# Patient Record
Sex: MALE | URBAN - METROPOLITAN AREA
[De-identification: names, ages, dates, MRNs, and addresses within clinical notes are randomized per-mention and may not be internally consistent; named-entity substitution may affect disease eponyms.]

---

## 2021-01-30 ENCOUNTER — IMMUNIZATIONS (OUTPATIENT)
Dept: FAMILY MEDICINE CLINIC | Facility: HOSPITAL | Age: 81
End: 2021-01-30

## 2021-01-30 DIAGNOSIS — Z23 ENCOUNTER FOR IMMUNIZATION: Primary | ICD-10-CM

## 2021-01-30 PROCEDURE — 0011A SARS-COV-2 / COVID-19 MRNA VACCINE (MODERNA) 100 MCG: CPT

## 2021-01-30 PROCEDURE — 91301 SARS-COV-2 / COVID-19 MRNA VACCINE (MODERNA) 100 MCG: CPT

## 2021-02-27 ENCOUNTER — IMMUNIZATIONS (OUTPATIENT)
Dept: FAMILY MEDICINE CLINIC | Facility: HOSPITAL | Age: 81
End: 2021-02-27

## 2021-02-27 DIAGNOSIS — Z23 ENCOUNTER FOR IMMUNIZATION: Primary | ICD-10-CM

## 2021-02-27 PROCEDURE — 91301 SARS-COV-2 / COVID-19 MRNA VACCINE (MODERNA) 100 MCG: CPT

## 2021-02-27 PROCEDURE — 0012A SARS-COV-2 / COVID-19 MRNA VACCINE (MODERNA) 100 MCG: CPT

## 2024-04-10 ENCOUNTER — TELEPHONE (OUTPATIENT)
Dept: CARDIOTHORACIC SURGERY | Facility: CLINIC | Age: 84
End: 2024-04-10
Payer: MEDICARE

## 2024-04-10 DIAGNOSIS — I35.0 NONRHEUMATIC AORTIC VALVE STENOSIS: Primary | ICD-10-CM

## 2024-04-10 NOTE — TELEPHONE ENCOUNTER
Direct referral to Dr. Campos for AS consult from Dr. Ross Gillespie.    Please request recent cardiology notes, cath disc/report, echo disc/report.    Please schedule NP appt with CTA TAVR prior.

## 2024-04-11 NOTE — TELEPHONE ENCOUNTER
Records were scanned this morning.  I called pt and LM.    Echo 3/15 at [facility].  I will ask pt if he has echo on disc.      Cath was done in 2022.  Will another be needed?

## 2024-04-11 NOTE — TELEPHONE ENCOUNTER
Ok TY.  I placed call to referring office.  They do not yet have the cardiac cath report.  They can confirm this was done on 4/10 at [facility].    Called [facility] Med Records.  Cath report received.

## 2024-04-15 NOTE — TELEPHONE ENCOUNTER
Pt returned call in regards to NPV with Dr. Campos. Pt's insurance has been updated on file.     Pt can be reached at [phone].

## 2024-04-15 NOTE — TELEPHONE ENCOUNTER
Spoke to pt, scheduled NPV with RR 5/13/24 @ 1:30 with CTA TAVR prior 5/13/24 @ 9:20. NPP and reminders mailed out.

## 2024-05-06 ENCOUNTER — TELEPHONE (OUTPATIENT)
Dept: CARDIOTHORACIC SURGERY | Facility: CLINIC | Age: 84
End: 2024-05-06

## 2024-05-06 NOTE — TELEPHONE ENCOUNTER
Hello! This pt is coming in to see Dr. Campos 5/13. Can we request the echo images from [facility] 03/15/2024 and [facility]. Thanks!

## 2024-05-08 ENCOUNTER — DOCUMENTATION (OUTPATIENT)
Dept: CARDIOTHORACIC SURGERY | Facility: CLINIC | Age: 84
End: 2024-05-08
Payer: MEDICARE

## 2024-05-08 DIAGNOSIS — I35.0 NONRHEUMATIC AORTIC VALVE STENOSIS: Primary | ICD-10-CM

## 2024-05-08 NOTE — PROGRESS NOTES
**Preliminary risk based on current available patient data**  Procedure Type: Isolated AVR   PERIOPERATIVE OUTCOME ESTIMATE %   Operative Mortality 3.9%   Morbidity & Mortality 17.3%   Stroke 2.61%   Renal Failure 3.39%   Reoperation 4.44%   Prolonged Ventilation 8.97%   Deep Sternal Wound Infection 0.229%   Long Hospital Stay (>14 days) 6.79%   Short Hospital Stay (<6 days)* 27%     Procedure Type: CABG + AVR   PERIOPERATIVE OUTCOME ESTIMATE %   Operative Mortality 5.45%   Morbidity & Mortality 18.8%   Stroke 1.35%   Renal Failure 4%   Reoperation 5.26%   Prolonged Ventilation 12.2%   Deep Sternal Wound Infection 0.454%   Long Hospital Stay (>14 days) 11.1%   Short Hospital Stay (<6 days)* 19.8%

## 2024-05-13 ENCOUNTER — HOSPITAL ENCOUNTER (OUTPATIENT)
Dept: RADIOLOGY | Facility: HOSPITAL | Age: 84
Discharge: HOME | End: 2024-05-13
Attending: STUDENT IN AN ORGANIZED HEALTH CARE EDUCATION/TRAINING PROGRAM
Payer: MEDICARE

## 2024-05-13 ENCOUNTER — HOSPITAL ENCOUNTER (OUTPATIENT)
Dept: CARDIOLOGY | Facility: HOSPITAL | Age: 84
Discharge: HOME | End: 2024-05-13
Attending: NURSE PRACTITIONER
Payer: MEDICARE

## 2024-05-13 ENCOUNTER — APPOINTMENT (OUTPATIENT)
Dept: LAB | Facility: HOSPITAL | Age: 84
End: 2024-05-13
Attending: NURSE PRACTITIONER
Payer: MEDICARE

## 2024-05-13 ENCOUNTER — OFFICE VISIT (OUTPATIENT)
Dept: CARDIOTHORACIC SURGERY | Facility: CLINIC | Age: 84
End: 2024-05-13
Payer: MEDICARE

## 2024-05-13 ENCOUNTER — TELEPHONE (OUTPATIENT)
Dept: CARDIOTHORACIC SURGERY | Facility: CLINIC | Age: 84
End: 2024-05-13

## 2024-05-13 ENCOUNTER — OFFICE VISIT (OUTPATIENT)
Dept: CARDIOLOGY | Facility: CLINIC | Age: 84
End: 2024-05-13
Payer: MEDICARE

## 2024-05-13 VITALS
DIASTOLIC BLOOD PRESSURE: 98 MMHG | RESPIRATION RATE: 18 BRPM | WEIGHT: 226 LBS | HEART RATE: 67 BPM | BODY MASS INDEX: 33.47 KG/M2 | SYSTOLIC BLOOD PRESSURE: 139 MMHG | TEMPERATURE: 98.1 F | OXYGEN SATURATION: 99 % | HEIGHT: 69 IN

## 2024-05-13 VITALS — HEIGHT: 69 IN | SYSTOLIC BLOOD PRESSURE: 139 MMHG | DIASTOLIC BLOOD PRESSURE: 98 MMHG

## 2024-05-13 DIAGNOSIS — I35.0 AORTIC VALVE STENOSIS, ETIOLOGY OF CARDIAC VALVE DISEASE UNSPECIFIED: ICD-10-CM

## 2024-05-13 DIAGNOSIS — R09.89 CAROTID BRUIT, UNSPECIFIED LATERALITY: ICD-10-CM

## 2024-05-13 DIAGNOSIS — R79.1 ABNORMAL COAGULATION PROFILE: ICD-10-CM

## 2024-05-13 DIAGNOSIS — I25.10 CORONARY ARTERY DISEASE, UNSPECIFIED VESSEL OR LESION TYPE, UNSPECIFIED WHETHER ANGINA PRESENT, UNSPECIFIED WHETHER NATIVE OR TRANSPLANTED HEART: Primary | ICD-10-CM

## 2024-05-13 DIAGNOSIS — R79.9 ABNORMAL FINDING OF BLOOD CHEMISTRY, UNSPECIFIED: ICD-10-CM

## 2024-05-13 DIAGNOSIS — I25.810 CORONARY ARTERY DISEASE INVOLVING NONAUTOLOGOUS BIOLOGICAL CORONARY BYPASS GRAFT WITHOUT ANGINA PECTORIS: ICD-10-CM

## 2024-05-13 DIAGNOSIS — I35.0 NONRHEUMATIC AORTIC VALVE STENOSIS: ICD-10-CM

## 2024-05-13 DIAGNOSIS — Z00.6 ENCOUNTER FOR EXAMINATION FOR NORMAL COMPARISON AND CONTROL IN CLINICAL RESEARCH PROGRAM: ICD-10-CM

## 2024-05-13 DIAGNOSIS — E03.9 HYPOTHYROIDISM, UNSPECIFIED TYPE: Primary | ICD-10-CM

## 2024-05-13 DIAGNOSIS — I25.10 CORONARY ARTERY DISEASE, UNSPECIFIED VESSEL OR LESION TYPE, UNSPECIFIED WHETHER ANGINA PRESENT, UNSPECIFIED WHETHER NATIVE OR TRANSPLANTED HEART: ICD-10-CM

## 2024-05-13 DIAGNOSIS — I35.0 AORTIC VALVE STENOSIS, ETIOLOGY OF CARDIAC VALVE DISEASE UNSPECIFIED: Primary | ICD-10-CM

## 2024-05-13 DIAGNOSIS — I35.0 NONRHEUMATIC AORTIC VALVE STENOSIS: Primary | ICD-10-CM

## 2024-05-13 LAB
ALBUMIN SERPL-MCNC: 4.3 G/DL (ref 3.5–5.7)
ALP SERPL-CCNC: 65 IU/L (ref 34–125)
ALT SERPL-CCNC: 19 IU/L (ref 7–52)
ANION GAP SERPL CALC-SCNC: 8 MEQ/L (ref 3–15)
AORTIC ROOT ANNULUS - M-MODE: 3.25 CM
AORTIC VALVE AT: 103.6 MS
AORTIC VALVE MEAN VELOCITY: 2.83 M/S
AORTIC VALVE VELOCITY TIME INTEGRAL: 100.28 CM
APTT PPP: 28 SEC (ref 23–35)
AST SERPL-CCNC: 23 IU/L (ref 13–39)
AV MEAN GRADIENT: 39.17 MMHG
AV PEAK GRADIENT: 62.83 MMHG
AV PEAK VELOCITY-S: 4.01 M/S
AV VALVE AREA: 0.76 CM2
AV VELOCITY RATIO: 0.26
AVA (VTI): 1.07 CM2
BASOPHILS # BLD: 0.04 K/UL (ref 0.01–0.1)
BASOPHILS NFR BLD: 0.5 %
BILIRUB SERPL-MCNC: 0.6 MG/DL (ref 0.3–1.2)
BNP SERPL-MCNC: 59 PG/ML
BUN SERPL-MCNC: 16 MG/DL (ref 7–25)
CALCIUM SERPL-MCNC: 9.6 MG/DL (ref 8.6–10.3)
CHLORIDE SERPL-SCNC: 104 MEQ/L (ref 98–107)
CO2 SERPL-SCNC: 27 MEQ/L (ref 21–31)
CREAT SERPL-MCNC: 1 MG/DL (ref 0.7–1.3)
CUSP SEPARATION: 1.77 CM
DIFFERENTIAL METHOD BLD: NORMAL
DOP CALC LVOT STROKE VOLUME: 107.24 ML
DOP CALC RVOT VTI: 11.77 CM
E WAVE DECELERATION TIME: 220.94 MS
E WAVE DECELERATION TIME: 220.94 MS
E/A RATIO: 0.88
E/E' RATIO: 13.21
E/LAT E' RATIO: 18.97
EDV (BP): 147.33 ML
EF (A4C): 59.53 %
EF A2C: 50.13 %
EGFRCR SERPLBLD CKD-EPI 2021: >60 ML/MIN/1.73M*2
EJECTION FRACTION: 55.29 %
EOSINOPHIL # BLD: 0.22 K/UL (ref 0.04–0.54)
EOSINOPHIL NFR BLD: 2.8 %
ERYTHROCYTE [DISTWIDTH] IN BLOOD BY AUTOMATED COUNT: 14.1 % (ref 11.6–14.4)
EST. AVERAGE GLUCOSE BLD GHB EST-MCNC: 131 MG/DL
ESV (BP): 65.88 ML
FRACTIONAL SHORTENING: 36.39 %
GLUCOSE SERPL-MCNC: 99 MG/DL (ref 70–99)
HBA1C MFR BLD: 6.2 %
HCT VFR BLD AUTO: 42.7 % (ref 40.1–51)
HEART RATE: 58 BPM
HGB BLD-MCNC: 14.1 G/DL (ref 13.7–17.5)
IMM GRANULOCYTES # BLD AUTO: 0.02 K/UL (ref 0–0.08)
IMM GRANULOCYTES NFR BLD AUTO: 0.3 %
INR PPP: 1.1
INTERVENTRICULAR SEPTUM: 0.88 CM
LA ESV (BP): 63.62 ML
LAAS-AP2: 23.16 CM2
LAAS-AP4: 20.2 CM2
LAD 2D: 4.45 CM
LAV-S: 71.1 ML
LEFT ATRIUM VOLUME: 55.4 ML
LEFT INTERNAL DIMENSION IN SYSTOLE: 3.95 CM
LEFT VENTRICLE DIASTOLIC VOLUME: 140.1 ML
LEFT VENTRICLE SYSTOLIC VOLUME: 56.7 ML
LEFT VENTRICULAR INTERNAL DIMENSION IN DIASTOLE: 6.21 CM
LEFT VENTRICULAR MASS: 258.32 G
LEFT VENTRICULAR POSTERIOR WALL IN END DIASTOLE: 1.1 CM
LV DIASTOLIC VOLUME: 149.4 ML
LV ESV (APICAL 2 CHAMBER): 74.5 ML
LVCO: 4.68 LITERS PER MINUTE
LVOT 2D: 2.29 CM
LVOT A: 4.13 CM2
LVOT MG: 1.78 MMHG
LVOT MV: 0.63 M/S
LVOT PEAK VELOCITY: 0.94 M/S
LVOT PG: 3.55 MMHG
LVOT VTI: 26.05 CM
LYMPHOCYTES # BLD: 1.78 K/UL (ref 1.2–3.5)
LYMPHOCYTES NFR BLD: 22.8 %
MCH RBC QN AUTO: 30.2 PG (ref 28–33.2)
MCHC RBC AUTO-ENTMCNC: 33 G/DL (ref 32.2–36.5)
MCV RBC AUTO: 91.4 FL (ref 83–98)
MONOCYTES # BLD: 0.62 K/UL (ref 0.3–1)
MONOCYTES NFR BLD: 7.9 %
MR FLOW: 0.37 ML/S
MV D: 2.86 CM
MV E'TISSUE VEL-LAT: 0.05 M/S
MV E'TISSUE VEL-MED: 0.07 M/S
MV PEAK A VEL: 1.09 M/S
MV PEAK E VEL: 0.96 M/S
MV REGURGITANT FRACTION: 40.31 %
MVA (PISA): 1.06 CM2
NEUTROPHILS # BLD: 5.12 K/UL (ref 1.7–7)
NEUTS SEG NFR BLD: 65.7 %
NRBC BLD-RTO: 0 %
PDW BLD AUTO: 11.2 FL (ref 9.4–12.4)
PISA ALIAS VEL MV: 0.35 M/S
PISA RADIUS: 0.41 CM
PLATELET # BLD AUTO: 276 K/UL (ref 150–350)
POTASSIUM SERPL-SCNC: 4.3 MEQ/L (ref 3.5–5.1)
PROT SERPL-MCNC: 7 G/DL (ref 6–8.2)
PROTHROMBIN TIME: 13.8 SEC (ref 12.2–14.5)
RBC # BLD AUTO: 4.67 M/UL (ref 4.5–5.8)
SODIUM SERPL-SCNC: 139 MEQ/L (ref 136–145)
TSH SERPL DL<=0.05 MIU/L-ACNC: 24.38 MIU/L (ref 0.34–5.6)
WBC # BLD AUTO: 7.8 K/UL (ref 3.8–10.5)

## 2024-05-13 PROCEDURE — 99204 OFFICE O/P NEW MOD 45 MIN: CPT | Performed by: THORACIC SURGERY (CARDIOTHORACIC VASCULAR SURGERY)

## 2024-05-13 PROCEDURE — 93306 TTE W/DOPPLER COMPLETE: CPT

## 2024-05-13 PROCEDURE — 99205 OFFICE O/P NEW HI 60 MIN: CPT | Performed by: INTERNAL MEDICINE

## 2024-05-13 PROCEDURE — 71275 CT ANGIOGRAPHY CHEST: CPT

## 2024-05-13 PROCEDURE — 84443 ASSAY THYROID STIM HORMONE: CPT

## 2024-05-13 PROCEDURE — 85025 COMPLETE CBC W/AUTO DIFF WBC: CPT

## 2024-05-13 PROCEDURE — 93306 TTE W/DOPPLER COMPLETE: CPT | Mod: 26 | Performed by: INTERNAL MEDICINE

## 2024-05-13 PROCEDURE — 83880 ASSAY OF NATRIURETIC PEPTIDE: CPT

## 2024-05-13 PROCEDURE — 83036 HEMOGLOBIN GLYCOSYLATED A1C: CPT

## 2024-05-13 PROCEDURE — 87081 CULTURE SCREEN ONLY: CPT

## 2024-05-13 PROCEDURE — 80053 COMPREHEN METABOLIC PANEL: CPT

## 2024-05-13 PROCEDURE — 93000 ELECTROCARDIOGRAM COMPLETE: CPT | Performed by: THORACIC SURGERY (CARDIOTHORACIC VASCULAR SURGERY)

## 2024-05-13 PROCEDURE — 86901 BLOOD TYPING SEROLOGIC RH(D): CPT

## 2024-05-13 PROCEDURE — 85730 THROMBOPLASTIN TIME PARTIAL: CPT

## 2024-05-13 PROCEDURE — 85610 PROTHROMBIN TIME: CPT

## 2024-05-13 PROCEDURE — 63600105 HC IODINE BASED CONTRAST: Mod: JZ

## 2024-05-13 PROCEDURE — 36415 COLL VENOUS BLD VENIPUNCTURE: CPT

## 2024-05-13 RX ORDER — MUPIROCIN 20 MG/G
1 OINTMENT TOPICAL 2 TIMES DAILY
Qty: 10 G | Refills: 0 | Status: SHIPPED | OUTPATIENT
Start: 2024-05-13 | End: 2024-05-18

## 2024-05-13 RX ORDER — CHLORHEXIDINE GLUCONATE ORAL RINSE 1.2 MG/ML
15 SOLUTION DENTAL ONCE
Status: CANCELLED | OUTPATIENT
Start: 2024-05-13 | End: 2024-05-13

## 2024-05-13 RX ORDER — PANTOPRAZOLE SODIUM 40 MG/1
40 TABLET, DELAYED RELEASE ORAL NIGHTLY
Status: CANCELLED | OUTPATIENT
Start: 2024-05-13 | End: 2024-05-14

## 2024-05-13 RX ORDER — IOPAMIDOL 755 MG/ML
100 INJECTION, SOLUTION INTRAVASCULAR
Status: COMPLETED | OUTPATIENT
Start: 2024-05-13 | End: 2024-05-13

## 2024-05-13 RX ADMIN — IOPAMIDOL 100 ML: 755 INJECTION, SOLUTION INTRAVENOUS at 11:23

## 2024-05-13 NOTE — PROGRESS NOTES
.                     Interventional Cardiology   [facility]   [facility]                      [phone]        New patient consult : TAVR         Daljit TOBI Era Flores presents today for consultation of aortic stenosis and need for TAVR  referral from Dr Gillespie      Patient is 84 y.o. with aortic stenosis, CAD s/p minimally invasive bypass grafting in 2014 with Dr Deleon and subsequent PCI in 2016     He has been followed by Dr Gillespie and has had worsening Dyspnea on exertion. ECHO showed aortic stenosis advanced to severe and then  cath was completed and showed a patent LIMA to the LAD ,      Additional history  carotid artery stenosis, hyperlipidemia and hypertension     He reports he has been feeling more shortness of breath with activities . He doesn't have chest pain or palpitations. He has not lightheadedness or dizziness . No swelling in lower extremities.   He does not have stairs in the house so he does not do stair climbing often but his family notices he is huffing and puffing.     He is here with his wife whom is dependant on him due to her strokes. 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    Allergies: Gluten      Current Medications    Current Outpatient Medications:     aspirin 81 mg enteric coated tablet, Take 81 mg by mouth nightly., Disp: , Rfl:     clopidogreL  (PLAVIX) 75 mg tablet, Take 75 mg by mouth daily., Disp: , Rfl:     DULoxetine (CYMBALTA) 60 mg capsule, Take 60 mg by mouth nightly., Disp: , Rfl:     ezetimibe (ZETIA) 10 mg tablet, Take 10 mg by mouth nightly., Disp: , Rfl:     levothyroxine (SYNTHROID) 150 mcg tablet, Take 150 mcg by mouth daily., Disp: , Rfl:     LORazepam (ATIVAN) 1 mg tablet, Take 1 mg by mouth 2 (two) times a day., Disp: , Rfl:     metFORMIN (GLUCOPHAGE) 500 mg tablet, Take 500 mg by mouth 2 (two) times a day with meals., Disp: , Rfl:     mupirocin (BACTROBAN) 2 % ointment, Apply 1 Application topically 2 (two) times a day for 5 days. Apply small amount to both nostrils with Qtip ,morning and bedtime, starting five days before surgery., Disp: 10 g, Rfl: 0    pantoprazole (PROTONIX) 40 mg EC tablet, Take 40 mg by mouth nightly., Disp: , Rfl:     rosuvastatin (CRESTOR) 40 mg tablet, Take 40 mg by mouth daily., Disp: , Rfl:     tamsulosin (FLOMAX) 0.4 mg capsule, Take 0.4 mg by mouth nightly., Disp: , Rfl:     temazepam (RESTORIL) 15 mg capsule, Take 15 mg by mouth nightly as needed for sleep., Disp: , Rfl:      Review of Systems  ROS as above    Objective     Physicial Exam  Physical Exam  Vitals reviewed.   Constitutional:       Appearance: Normal appearance.   Eyes:      Pupils: Pupils are equal, round, and reactive to light.   Cardiovascular:      Heart sounds: Murmur heard.      Harsh midsystolic murmur is present with a grade of 3/6 at the upper right sternal border radiating to the neck.   Pulmonary:      Effort: Pulmonary effort is normal.      Breath sounds: Normal breath sounds.   Abdominal:      General: Abdomen is flat.      Palpations: Abdomen is soft.   Musculoskeletal:         General: Swelling (+1 lower extremities) present.   Skin:     General: Skin is warm and dry.   Neurological:      General: No focal deficit present.      Mental Status: He is alert and oriented to person, place, and time.   Psychiatric:         Mood and  Affect: Mood normal.         Behavior: Behavior normal.         Thought Content: Thought content normal.         Judgment: Judgment normal.       See vitals from previous encounter today       Labs  ECHO 5/13/24  Interpretation Summary       The left ventricular cavity size is normal with normal wall thickness and preserved systolic function. Estimated EF 60-65%. No regional wall motion abnormalities. Indeterminate diastolic function.     The aortic valve is tricuspid. Severe aortic stenosis with a peak velocity of 4.3 m/s and a mean gradient of 41 mmHg measured from the apex. Calculated aortic valve area of 0.9 cm2. Trace regurgitation.      The visualized portions of the aortic root and ascending aorta are normal in size.     The mitral valve is mildly thickened. Mild mitral annular calcification. No stenosis. Mild regurgitation.      The left atrium is normal in size.      The right ventricular cavity is normal with preserved systolic function.      The pulmonic valve is not well seen. There is trace pulmonic regurgitation.     The tricuspid valve is normal in appearance. Trace regurgitation with insufficient regurgitant jet to estimate RVSP.       Right atrium is normal in size.     The IVC is normal in size and collapses > 50% with inspiration consistent with normal right atrial pressures.     Trace pericardial effusion.      No prior study for comparison.         Imaging  Cardiac cath 4/10/24   VALVE FINDINGS:   There is severe aortic valve stenosis.   CORONARY ANGIOGRAPHY:   The coronary circulation is right dominant.   Coronary Anatomy:   Left Main Coronary Artery:   The left main is large sized and calcified and shows a distal 30% stenosis.   Left Anterior Descending Coronary Artery:   The left anterior descending is a large sized vessel that wraps the apex serving a medium proximal   diagonal branch and a large mid diagonal branch. There is a long segment of widely patent stent in the   proximal to  midportion and following the second diagonal branch, there is a focal 100% occlusion. The mid   to distal left anterior descending is served by patent bypass graft. The remainder the system shows mild   diffuse atherosclerosis.   Circumflex Coronary Artery:   The left circumflex is a medium sized, nondominant vessel that serves a large ramus intermedius and a   large inferolateral marginal branch. The entire system shows mild diffuse atherosclerosis.   Right Coronary Artery :   The right coronary artery is a large sized, dominant vessel that serves a large PDA and a medium   posterolateral branch. The entire system shows mild diffuse atherosclerosis.   CORONARY GRAFTS:   LIMA Graft:   LIMA to left anterior descending is a widely patent and healthy bypass graft that touches down to the mid   left anterior descending serving the mid to apical vessel with brisk and unobstructed flow. There is a   long segment of patent stent in the mid to distal vessel.   IMPRESSION:   1. Severe, symptomatic aortic stenosis with a calculated aortic valve area of 0.77 cmÂ².   2. Well compensated coronary artery disease with multiple patent left anterior descending stents, a   widely patent LIMA to left anterior descending bypass graft, and mild residual coronary artery disease   otherwise.   3. Normal left and right heart catheterization pressures otherwise without evidence of pulmonary   hypertension or volume overload.       CT TAVR   arrative & Impression   CTA Protocol: TAVR     CLINICAL HISTORY: Aortic Stenosis.     COMMENT:     1. PROCEDURE: The patient was brought to the CT prep room in stable condition.  The patient was n.p.o. for 4 hours. An 18-gauge Intracath IV was started in the  right antecubital space. The patient's heart rate and blood pressure were  recorded. A TAVR protocol multidetector CT of the heart and aorta was then  performed in 3 phases includin). Noncontrast CT of the chest. 2).  Retrospective ECG gated CTA  of the heart, 3). Helical non gated CTA of the  chest, abdomen and pelvis. The CT angiogram was performed utilizing 100 ml of  Isovue 370 iodine contrast administered intravenously. One or more dose  reduction techniques were utilized for this examination. Postprocessing for 3D  evaluation was completed and sent to [employer] 3D workstation for  interpretation. The patient tolerated the procedure well and was discharged in  stable condition. The overall quality of the scan was good.     2. CARDIAC FINDINGS:     CORONARY ARTERIES: Three-vessel coronary atherosclerosis.  Evidence of prior  percutaneous coronary intervention.  Evidence of prior LIMA-left anterior  descending coronary artery aortocoronary bypass graft.  CACS: Not applicable     CARDIAC STRUCTURE:  Left Ventricle: Normal cavity size, mild concentric hypertrophy. Normal systolic  function. Normal wall motion.  Septal motion consistent with a  postoperative state.             Left Atrium: Mildly  dilated              Left Atrial Appendage: Patent  Right Ventricle: Normal cavity size and systolic  function               Right Atrium: Normal in size   Atrial septum: Patent foramen ovale      SVC IVC: Normal connections         Mitral Valve: Mildly thickened.  Mild mitral annular  calcification.             Tricuspid Valve: Not well seen               Pericardium: Intact     AORTIC VALVE:  Description: Tricuspid aortic valve. Thickened and calcified cusps with  decreased systolic excursion.  Aortic valve area measures 1.1 sq cm by direct  planimetry  Aortic Valve Calcium Score: 1894  Subvalvular LVOT Calcium: None  Aortic Root: Atherosclerotic     TAVR AORTIC VALVE MEASUREMENTS: Phase: 30     Aortic valvular annulus: Diameter: Max 34 mm. Min 24 mm. Area: 631 mm2.  Perimeter: 92 mm.  Sinotubular Junction Diameter: Max 34 mm. Min 31 mm. Area 852 mm2. Perimeter 105  mm.  Sinus of Valsalva Diameter: Max 36 mm. Min 34 mm Avg 35 . Height 25 mm.  LVOT: 5 mm below  annulus. Diameter: Max 35 mm. Min 24 mm. Area 627 mm2.  Perimeter 93 mm.  Left coronary artery height: 14 mm.  Right coronary artery height: 12 mm.  Ascending thoracic aorta maximum diameter: 39 x 37 mm.  Optimal Fluoroscopic CR angle: Welsh 13 CRA 10 degrees.  Aortic Neck angle: 60 degrees.     SUMMARY:  1. Tricuspid aortic valve. Cine and tomographic imaging features are consistent  with aortic stenosis. Please see body of the report for detailed measurements.  2. Preserved biventricular systolic function.  3. Three-vessel coronary atherosclerosis.  Evidence of prior percutaneous  coronary intervention.  Evidence of prior LIMA-left anterior descending  aortocoronary bypass graft.  Native vessel patency/stenosis severity could not  be assessed in this study.  Recommend correlation with invasive angiography as  appropriate.     Cardiac interpretation by Wero Guzman M.D. on       Assessment:    Aortic Stenosis     Echo personally reviewed by Dr Suh   Peak Velocity 4.3 m/s, Mean Gradient 41 mmHg, BIANCA 0.9 cm2, EF 55%, Calcium Score 1894.    NYHA II  Class I: No limitation of physical activity. Ordinary physical activity does not cause symptoms of HF.  Class II: Slight limitation of physical activity. Comfortable at rest, but ordinary physical activity results in symptoms of HF.  Class III: Marked limitation of physical activity. Comfortable at rest, but less than ordinary activity causes symptoms of HF.  Class IV: Symptoms occur even at rest; discomfort with any physical activity. Unable to carry on any physical activity without symptoms of HF.    No history of syncopal or presyncopal episodes.    Patient has not had recent hospitalization for CHF.          Patients STS Risk for Surgical AVR is   **Preliminary risk based on current available patient data**      Procedure Type: Isolated AVR   PERIOPERATIVE OUTCOME ESTIMATE %   Operative Mortality 3.9%   Morbidity & Mortality 17.3%   Stroke 2.61%   Renal Failure  3.39%   Reoperation 4.44%   Prolonged Ventilation 8.97%   Deep Sternal Wound Infection 0.229%   Long Hospital Stay (>14 days) 6.79%   Short Hospital Stay (<6 days)* 27%          Procedure Type: CABG + AVR   PERIOPERATIVE OUTCOME ESTIMATE %   Operative Mortality 5.45%   Morbidity & Mortality 18.8%   Stroke 1.35%   Renal Failure 4%   Reoperation 5.26%   Prolonged Ventilation 12.2%   Deep Sternal Wound Infection 0.454%   Long Hospital Stay (>14 days) 11.1%   Short Hospital Stay (<6 days)* 19.8%          .  Patient passed Frailty Exam.  Patient risk per MD assessment is high due to age and co morbidities and hostile chest .        Plan   Recommend proceeding with TAVR.  Patient is in agreement based on a personal decision to increase quality of life        Tentative surgery date of 5/20/24 for TAVR  was offered and accepted by patient.    He will complete his PATS today , EKG completed in the office , and cardiac surgery was seen as well today .   He will need dental clearance and carotid ultrasounds           I have discussed with the patient and family the rationale for procedures as well as possible alternatives.  We discussed potential risks and complications associated with this procedure. The risks include, but are not limited to: bleeding, infection, arrhythmias, myocardial infarction, para-valvular insufficiency,  stroke, death, renal as well as pulmonary insufficiency and the possibility of blood transfusions,  permanent pacemaker placement intra-aortic balloon placement,  assist device placement, cardiac wire perforation, and requirement of CPB.   Time was spent educating the patient about their heart disease diagnosis and treatment options. All questions and concerns were addressed.The patient understands and agrees to proceed.      By signing my name below, I, KIMBERLI Malik attest that this documentation has anette prepared under the direction and in the presence of KIMBERLI Dalton MD        I, Win Suh DO, personally performed the services described in this documentation as described by UVALDO in my presence, and it is accurate and complete.    DO Win Parkinson D.O., [facility]

## 2024-05-13 NOTE — PROGRESS NOTES
"Daljit Girard    [id number]    Your doctor has referred you for a CT examination that requires the injection of an iodinated contrast material into your bloodstream. This iodinated contrast material, sometimes referred to as \"x-ray dye\" allows for better interpretation of the x-ray films or CT images and results in a more accurate interpretation of the examination.     Without the use of iodinated contrast (x-ray dye), the examination may be less informative and result in a suboptimal examination, and possibly a delay in diagnosis and, if needed, treatment.     The iodinated contrast material is given through a small needle or catheter placed into a vein, usually on the inside of the elbow, on the back of hand, or in a vein in the foot or lower leg.    The most common, though still rare, potential reaction to an intravenous contrast injection is an allergic-like reaction. Most allergic-like reactions are mild, though a small subset of people can have more severe reactions. Mild reactions include mild / scattered hives, itching, scratchy throat, sneezing and nasal congestion. More severe reactions include facial swelling, severe difficulty breathing, wheezing and anaphylactic shock. Those with a prior history allergic-like reaction to the same class of contrast media (such as CT contrast or MRI contrast) are at the highest risk for an allergic reaction.     If you believe you had an allergic reaction to contrast in the past, please let our staff know. We can determine if this increases your risk for a future reaction and provide steps to decrease the risk. This may delay your examination, but it decreases the risk of having a new and possibly more severe reaction to the contrast injection.    People with a history of prior allergic reactions to other substances (such as unrelated medications and food) and patients with a history of asthma have slightly increased risk for an allergic reaction from contrast " material when compared with the general population, but do not require to be pretreated prior to a contrast injection.    You should notify the physician, nurse or technologist if you have ever had any of these conditions or if you have any questions.

## 2024-05-13 NOTE — TELEPHONE ENCOUNTER
NAME:Daljit Girard Jr                                                             SURGEON: Dr Campos   : 1940 PROCEDURE: TAVR   DIAGNOSIS:No diagnosis found.                                                              OR DATE :24   ALLERGIES:   Allergies   Allergen Reactions    Gluten      Celiac disease     POV: 4 Weeks   PREADMISSION TESTING: Completed POV w/ Echo:  Yes    TTE: Completed   POV PROVIDER:[facility]   CATH: Completed  If required with: Dr Suh   DIALYSIS:    No   CTA C/A/P: Completed      PREP FOR CASE:Completed      MUPIROCIN Rx SENT: Yes   INTERVENTIONAL CARD APPT:  No COMPLETED    CAROTIDS:   Required HEART FAILURE APPOINTMENT: No    DENTAL: Required       PFT'S: N/A   CONSENT SIGNEDYes    Any Additional Testing Needed:         ECG: No       6 MIN WALK TEST:    N/A Comments:

## 2024-05-13 NOTE — PROGRESS NOTES
I, Joe Campos MD, personally performed the services described in this documentation as scribed by KIMBERLI Malik in my presence, and it is both accurate and complete.    5/15/2024 10:44 AM    Jeo Campos MD, MS, FACS, [facility]  System Surgical Director,  Structural Heart Program  [facility]    Associate Professor of Surgery,  [facility]                           [facility]  Cardiothoracic Surgery   Dr. Andres AG   [facility]                      [phone]        New patient consult : Aortic Stenosis         Daljit Girard Jr presents today for consultation of aortic stenosis referral for Dr Gillespie      Patient is 84 y.o. with aortic stenosis, CAD s/p minimally invasive bypass grafting in 2014 and subsequent PCI in 2016  , cath was completed and showed a patent LIMA to the LAD , carotid artery stenosis, hyperlipidemia and hypertension     He reports he has been feeling more shortness of breath with activities . He doesn't have chest pain or palpitations. He has not lightheadedness or dizziness . No swelling in lower extremities.   He does not have stairs in the house so he does not do stair climbing often but his family notices he is huffing and puffing.     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    Allergies: Gluten      Current Medications    Current Outpatient Medications:     aspirin 81 mg enteric coated tablet, Take 81 mg by mouth nightly., Disp: , Rfl:     clopidogreL (PLAVIX) 75 mg tablet, Take 75 mg by mouth daily., Disp: , Rfl:     DULoxetine (CYMBALTA) 60 mg capsule, Take 60 mg by mouth nightly., Disp: , Rfl:     ezetimibe (ZETIA) 10 mg tablet, Take 10 mg by mouth nightly., Disp: , Rfl:     levothyroxine (SYNTHROID) 150 mcg tablet, Take 150 mcg by mouth daily., Disp: , Rfl:     LORazepam (ATIVAN) 1 mg tablet, Take 1 mg by mouth 2 (two) times a day., Disp: , Rfl:     metFORMIN (GLUCOPHAGE) 500 mg tablet, Take 500 mg by mouth 2 (two) times a day with meals., Disp: , Rfl:     pantoprazole (PROTONIX) 40 mg EC tablet, Take 40 mg by mouth nightly., Disp: , Rfl:     rosuvastatin (CRESTOR) 40 mg tablet, Take 40 mg by mouth daily., Disp: , Rfl:     tamsulosin (FLOMAX) 0.4 mg capsule, Take 0.4 mg by mouth nightly., Disp: , Rfl:     temazepam (RESTORIL) 15 mg capsule, Take 15 mg by mouth nightly as needed for sleep., Disp: , Rfl:      Review of Systems  ROS as above    Objective     Physicial Exam  Physical Exam  Vitals reviewed.   Constitutional:       Appearance: Normal appearance.   Eyes:      Pupils: Pupils are equal, round, and reactive to light.   Cardiovascular:      Heart sounds: Murmur heard.      Harsh midsystolic murmur is present with a grade of 3/6 at the upper right sternal border radiating to the neck.   Pulmonary:      Effort: Pulmonary effort is normal.      Breath sounds: Normal breath sounds.   Abdominal:      General: Abdomen is flat.      Palpations: Abdomen is soft.   Musculoskeletal:         General: Swelling (+1 lower extremities) present.   Skin:     General: Skin is warm and dry.   Neurological:      General: No focal deficit present.      Mental Status: He is alert and oriented to person, place, and time.   Psychiatric:         Mood and Affect:  "Mood normal.         Behavior: Behavior normal.         Thought Content: Thought content normal.         Judgment: Judgment normal.   Visit Vitals  BP (!) 139/98 (BP Location: Right upper arm, Patient Position: Sitting)   Pulse 67   Temp 36.7 °C (98.1 °F) (Temporal)   Resp 18   Ht 1.753 m (5' 9\")   Wt 103 kg (226 lb)   SpO2 99%   BMI 33.37 kg/m²       Labs  ECHO 5/13/24  Interpretation Summary       The left ventricular cavity size is normal with normal wall thickness and preserved systolic function. Estimated EF 60-65%. No regional wall motion abnormalities. Indeterminate diastolic function.     The aortic valve is tricuspid. Severe aortic stenosis with a peak velocity of 4.3 m/s and a mean gradient of 41 mmHg measured from the apex. Calculated aortic valve area of 0.9 cm2. Trace regurgitation.      The visualized portions of the aortic root and ascending aorta are normal in size.     The mitral valve is mildly thickened. Mild mitral annular calcification. No stenosis. Mild regurgitation.      The left atrium is normal in size.      The right ventricular cavity is normal with preserved systolic function.      The pulmonic valve is not well seen. There is trace pulmonic regurgitation.     The tricuspid valve is normal in appearance. Trace regurgitation with insufficient regurgitant jet to estimate RVSP.       Right atrium is normal in size.     The IVC is normal in size and collapses > 50% with inspiration consistent with normal right atrial pressures.     Trace pericardial effusion.      No prior study for comparison.         Imaging  Cardiac cath 4/10/24   VALVE FINDINGS:   There is severe aortic valve stenosis.   CORONARY ANGIOGRAPHY:   The coronary circulation is right dominant.   Coronary Anatomy:   Left Main Coronary Artery:   The left main is large sized and calcified and shows a distal 30% stenosis.   Left Anterior Descending Coronary Artery:   The left anterior descending is a large sized vessel that wraps " the apex serving a medium proximal   diagonal branch and a large mid diagonal branch. There is a long segment of widely patent stent in the   proximal to midportion and following the second diagonal branch, there is a focal 100% occlusion. The mid   to distal left anterior descending is served by patent bypass graft. The remainder the system shows mild   diffuse atherosclerosis.   Circumflex Coronary Artery:   The left circumflex is a medium sized, nondominant vessel that serves a large ramus intermedius and a   large inferolateral marginal branch. The entire system shows mild diffuse atherosclerosis.   Right Coronary Artery :   The right coronary artery is a large sized, dominant vessel that serves a large PDA and a medium   posterolateral branch. The entire system shows mild diffuse atherosclerosis.   CORONARY GRAFTS:   LIMA Graft:   LIMA to left anterior descending is a widely patent and healthy bypass graft that touches down to the mid   left anterior descending serving the mid to apical vessel with brisk and unobstructed flow. There is a   long segment of patent stent in the mid to distal vessel.   IMPRESSION:   1. Severe, symptomatic aortic stenosis with a calculated aortic valve area of 0.77 cmÂ².   2. Well compensated coronary artery disease with multiple patent left anterior descending stents, a   widely patent LIMA to left anterior descending bypass graft, and mild residual coronary artery disease   otherwise.   3. Normal left and right heart catheterization pressures otherwise without evidence of pulmonary   hypertension or volume overload.       CT TAVR   arrative & Impression   CTA Protocol: TAVR     CLINICAL HISTORY: Aortic Stenosis.     COMMENT:     1. PROCEDURE: The patient was brought to the CT prep room in stable condition.  The patient was n.p.o. for 4 hours. An 18-gauge Intracath IV was started in the  right antecubital space. The patient's heart rate and blood pressure were  recorded. A TAVR  protocol multidetector CT of the heart and aorta was then  performed in 3 phases includin). Noncontrast CT of the chest. 2).  Retrospective ECG gated CTA of the heart, 3). Helical non gated CTA of the  chest, abdomen and pelvis. The CT angiogram was performed utilizing 100 ml of  Isovue 370 iodine contrast administered intravenously. One or more dose  reduction techniques were utilized for this examination. Postprocessing for 3D  evaluation was completed and sent to [employer] 3D workstation for  interpretation. The patient tolerated the procedure well and was discharged in  stable condition. The overall quality of the scan was good.     2. CARDIAC FINDINGS:     CORONARY ARTERIES: Three-vessel coronary atherosclerosis.  Evidence of prior  percutaneous coronary intervention.  Evidence of prior LIMA-left anterior  descending coronary artery aortocoronary bypass graft.  CACS: Not applicable     CARDIAC STRUCTURE:  Left Ventricle: Normal cavity size, mild concentric hypertrophy. Normal systolic  function. Normal wall motion.  Septal motion consistent with a  postoperative state.             Left Atrium: Mildly  dilated              Left Atrial Appendage: Patent  Right Ventricle: Normal cavity size and systolic  function               Right Atrium: Normal in size   Atrial septum: Patent foramen ovale      SVC IVC: Normal connections         Mitral Valve: Mildly thickened.  Mild mitral annular  calcification.             Tricuspid Valve: Not well seen               Pericardium: Intact     AORTIC VALVE:  Description: Tricuspid aortic valve. Thickened and calcified cusps with  decreased systolic excursion.  Aortic valve area measures 1.1 sq cm by direct  planimetry  Aortic Valve Calcium Score: 1894  Subvalvular LVOT Calcium: None  Aortic Root: Atherosclerotic     TAVR AORTIC VALVE MEASUREMENTS: Phase: 30     Aortic valvular annulus: Diameter: Max 34 mm. Min 24 mm. Area: 631 mm2.  Perimeter: 92 mm.  Sinotubular  Junction Diameter: Max 34 mm. Min 31 mm. Area 852 mm2. Perimeter 105  mm.  Sinus of Valsalva Diameter: Max 36 mm. Min 34 mm Avg 35 . Height 25 mm.  LVOT: 5 mm below annulus. Diameter: Max 35 mm. Min 24 mm. Area 627 mm2.  Perimeter 93 mm.  Left coronary artery height: 14 mm.  Right coronary artery height: 12 mm.  Ascending thoracic aorta maximum diameter: 39 x 37 mm.  Optimal Fluoroscopic CR angle: Vietnamese 13 CRA 10 degrees.  Aortic Neck angle: 60 degrees.     SUMMARY:  1. Tricuspid aortic valve. Cine and tomographic imaging features are consistent  with aortic stenosis. Please see body of the report for detailed measurements.  2. Preserved biventricular systolic function.  3. Three-vessel coronary atherosclerosis.  Evidence of prior percutaneous  coronary intervention.  Evidence of prior LIMA-left anterior descending  aortocoronary bypass graft.  Native vessel patency/stenosis severity could not  be assessed in this study.  Recommend correlation with invasive angiography as  appropriate.     Cardiac interpretation by Wero Guzman M.D. on       Assessment:    Aortic Stenosis     Echo personally reviewed by Joe Campos MD  Peak Velocity 4.3 m/s, Mean Gradient 41 mmHg, BIANCA 0.9 cm2, EF 55%, Calcium Score 1894.  Cath reviewed and grafts are patent     NYHA II  Class I: No limitation of physical activity. Ordinary physical activity does not cause symptoms of HF.  Class II: Slight limitation of physical activity. Comfortable at rest, but ordinary physical activity results in symptoms of HF.  Class III: Marked limitation of physical activity. Comfortable at rest, but less than ordinary activity causes symptoms of HF.  Class IV: Symptoms occur even at rest; discomfort with any physical activity. Unable to carry on any physical activity without symptoms of HF.    No history of syncopal or presyncopal episodes.    Patient has not had recent hospitalization for CHF.          Patients STS Risk for Surgical AVR is    **Preliminary risk based on current available patient data**      Procedure Type: Isolated AVR   PERIOPERATIVE OUTCOME ESTIMATE %   Operative Mortality 3.9%   Morbidity & Mortality 17.3%   Stroke 2.61%   Renal Failure 3.39%   Reoperation 4.44%   Prolonged Ventilation 8.97%   Deep Sternal Wound Infection 0.229%   Long Hospital Stay (>14 days) 6.79%   Short Hospital Stay (<6 days)* 27%          Procedure Type: CABG + AVR   PERIOPERATIVE OUTCOME ESTIMATE %   Operative Mortality 5.45%   Morbidity & Mortality 18.8%   Stroke 1.35%   Renal Failure 4%   Reoperation 5.26%   Prolonged Ventilation 12.2%   Deep Sternal Wound Infection 0.454%   Long Hospital Stay (>14 days) 11.1%   Short Hospital Stay (<6 days)* 19.8%          .  Patient passed Frailty Exam.  Patient risk per MD assessment is high due to age and co morbidities and hostile chest .        Plan   Recommend proceeding with TAVR.  Patient is in agreement based on a personal decision to increase quality of life    Shared Decision Making: I engaged in detailed shared decision making discussion with Daljit Girard Jr in order to educate her about the goals, risks, and benefits of valvular heart disease treatment options. We also discussed her goals, preferences, and values related to medical care. I also referred Daljit Girard Jr to https://www.cardiosmart.org/ for additional shared-decision making resources and videos. Daljit Girard Jr was also provided our contact information should any additional questions arise. After the shared decision making discussion, Daljit Girard Jr wishes to proceed with TAVR after further workup.     Tentative surgery date of 5/20/24 for TAVR  was offered and accepted by patient.      Cardiac surgery pre op instructions        TESTING ORDERED   Interventional Cardiology appointment -done today   Carotid Ultrasound-locally   Pre admission testing-today         What do I need to do to prepare for my surgery?    Please  review the below medication list carefully which outlines all of your current medications and when to stop them prior to surgery. It is very important that you follow these instructions and failure to do so may result in CANCELLATION of your surgery.         Will need to stop all vitamins/supplements/NSAIDs (ibuprofen, aleve, advil motrin) 7 days.   The mupirocin prescription has been sent to the pharmacy and will begin 5 days in advance of surgery as ordered.   No medications the morning of surgery with the exception of  synthroid .    ** If you take BRAND NECESSARY medications, please bring these with you on the day of your surgery.     Surgical Prep:  Enclosed you will find a prescription for Mupirocin nasal ointment which is a safety measure to prevent MRSA infections.  It should be applied just inside each nostril twice daily beginning five (5) days prior to your surgery.    On the evening prior to surgery, and again on the morning of surgery (prior to leaving home or your hotel), you must shower or bathe following these instructions: Wash once as you would normally, followed by a second wash with Hibiclens (a.k.a. Bactoshield, Chlorohexidine, or Surgi-Cleans) liquid soap.  This soap should be applied with a clean wash cloth and lathered over your whole torso: neck, armpits, chest and abdomen down to your groin. Do not use this soap to wash your face, your hair, or your genitals.  After washing, rinse thoroughly. Dry with a clean bath towel. We will provide this soap during your office visit.     What can I eat or drink?    Do not eat anything after midnight the day before your surgery.  This includes no food, candy, mints or gum.  If your surgery is open with a chest incision:  You should drink Ensure 2 hours prior to arrival.    PLEASE OBTAIN A BLOOD PRESSURE CUFF, SCALE, AND THERMOMETER PRIOR TO SURGERY    You will need these tools to assess your recovery once you return home.    Surgery Visit:  We recommend  that you set up a post-op visit with your medical provider for 2 weeks from the date of your surgery.      Please Remember:  Please pack lightly and wear loose clothing.  Remove all jewelry (i.e. wedding bands, body piercings) prior to arrival.  Do not bring any valuables to the hospital.   Do not wear contact lenses. Bring glasses if you need them.   Bring your insurance card and a picture ID.    Bring CPAP with you if you use one.   If you have an Advance Directive or a Living Will, please bring a copy.   Patients being admitted to the hospital overnight should plan to leave the hospital around 10 am on the day you are going home.      I have discussed with the patient and family the rationale for procedures as well as possible alternatives.  We discussed potential risks and complications associated with this procedure. The risks include, but are not limited to: bleeding, infection, arrhythmias, myocardial infarction, para-valvular insufficiency,  stroke, death, renal as well as pulmonary insufficiency and the possibility of blood transfusions,  permanent pacemaker placement intra-aortic balloon placement,  assist device placement, cardiac wire perforation, and requirement of CPB.   Time was spent educating the patient about their heart disease diagnosis and treatment options. All questions and concerns were addressed.The patient understands and agrees to proceed.      By signing my name below, I, KIMBERLI Malik attest that this documentation has anette prepared under the direction and in the presence of MD Letty Harrison CRNP

## 2024-05-13 NOTE — H&P (VIEW-ONLY)
.                     Interventional Cardiology   Win AG   [facility]                      [phone]        New patient consult : TAVR         Daljit TOBI Era Flores presents today for consultation of aortic stenosis and need for TAVR  referral from Dr Gillespie      Patient is 84 y.o. with aortic stenosis, CAD s/p minimally invasive bypass grafting in 2014 with Dr Deleon and subsequent PCI in 2016     He has been followed by Dr Gillespie and has had worsening Dyspnea on exertion. ECHO showed aortic stenosis advanced to severe and then  cath was completed and showed a patent LIMA to the LAD ,      Additional history  carotid artery stenosis, hyperlipidemia and hypertension     He reports he has been feeling more shortness of breath with activities . He doesn't have chest pain or palpitations. He has not lightheadedness or dizziness . No swelling in lower extremities.   He does not have stairs in the house so he does not do stair climbing often but his family notices he is huffing and puffing.     He is here with his wife whom is dependant on him due to her strokes. 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    Allergies: Gluten      Current Medications    Current Outpatient Medications:     aspirin 81 mg enteric coated tablet, Take 81 mg by mouth nightly., Disp: , Rfl:     clopidogreL  (PLAVIX) 75 mg tablet, Take 75 mg by mouth daily., Disp: , Rfl:     DULoxetine (CYMBALTA) 60 mg capsule, Take 60 mg by mouth nightly., Disp: , Rfl:     ezetimibe (ZETIA) 10 mg tablet, Take 10 mg by mouth nightly., Disp: , Rfl:     levothyroxine (SYNTHROID) 150 mcg tablet, Take 150 mcg by mouth daily., Disp: , Rfl:     LORazepam (ATIVAN) 1 mg tablet, Take 1 mg by mouth 2 (two) times a day., Disp: , Rfl:     metFORMIN (GLUCOPHAGE) 500 mg tablet, Take 500 mg by mouth 2 (two) times a day with meals., Disp: , Rfl:     mupirocin (BACTROBAN) 2 % ointment, Apply 1 Application topically 2 (two) times a day for 5 days. Apply small amount to both nostrils with Qtip ,morning and bedtime, starting five days before surgery., Disp: 10 g, Rfl: 0    pantoprazole (PROTONIX) 40 mg EC tablet, Take 40 mg by mouth nightly., Disp: , Rfl:     rosuvastatin (CRESTOR) 40 mg tablet, Take 40 mg by mouth daily., Disp: , Rfl:     tamsulosin (FLOMAX) 0.4 mg capsule, Take 0.4 mg by mouth nightly., Disp: , Rfl:     temazepam (RESTORIL) 15 mg capsule, Take 15 mg by mouth nightly as needed for sleep., Disp: , Rfl:      Review of Systems  ROS as above    Objective     Physicial Exam  Physical Exam  Vitals reviewed.   Constitutional:       Appearance: Normal appearance.   Eyes:      Pupils: Pupils are equal, round, and reactive to light.   Cardiovascular:      Heart sounds: Murmur heard.      Harsh midsystolic murmur is present with a grade of 3/6 at the upper right sternal border radiating to the neck.   Pulmonary:      Effort: Pulmonary effort is normal.      Breath sounds: Normal breath sounds.   Abdominal:      General: Abdomen is flat.      Palpations: Abdomen is soft.   Musculoskeletal:         General: Swelling (+1 lower extremities) present.   Skin:     General: Skin is warm and dry.   Neurological:      General: No focal deficit present.      Mental Status: He is alert and oriented to person, place, and time.   Psychiatric:         Mood and  Affect: Mood normal.         Behavior: Behavior normal.         Thought Content: Thought content normal.         Judgment: Judgment normal.       See vitals from previous encounter today       Labs  ECHO 5/13/24  Interpretation Summary       The left ventricular cavity size is normal with normal wall thickness and preserved systolic function. Estimated EF 60-65%. No regional wall motion abnormalities. Indeterminate diastolic function.     The aortic valve is tricuspid. Severe aortic stenosis with a peak velocity of 4.3 m/s and a mean gradient of 41 mmHg measured from the apex. Calculated aortic valve area of 0.9 cm2. Trace regurgitation.      The visualized portions of the aortic root and ascending aorta are normal in size.     The mitral valve is mildly thickened. Mild mitral annular calcification. No stenosis. Mild regurgitation.      The left atrium is normal in size.      The right ventricular cavity is normal with preserved systolic function.      The pulmonic valve is not well seen. There is trace pulmonic regurgitation.     The tricuspid valve is normal in appearance. Trace regurgitation with insufficient regurgitant jet to estimate RVSP.       Right atrium is normal in size.     The IVC is normal in size and collapses > 50% with inspiration consistent with normal right atrial pressures.     Trace pericardial effusion.      No prior study for comparison.         Imaging  Cardiac cath 4/10/24   VALVE FINDINGS:   There is severe aortic valve stenosis.   CORONARY ANGIOGRAPHY:   The coronary circulation is right dominant.   Coronary Anatomy:   Left Main Coronary Artery:   The left main is large sized and calcified and shows a distal 30% stenosis.   Left Anterior Descending Coronary Artery:   The left anterior descending is a large sized vessel that wraps the apex serving a medium proximal   diagonal branch and a large mid diagonal branch. There is a long segment of widely patent stent in the   proximal to  midportion and following the second diagonal branch, there is a focal 100% occlusion. The mid   to distal left anterior descending is served by patent bypass graft. The remainder the system shows mild   diffuse atherosclerosis.   Circumflex Coronary Artery:   The left circumflex is a medium sized, nondominant vessel that serves a large ramus intermedius and a   large inferolateral marginal branch. The entire system shows mild diffuse atherosclerosis.   Right Coronary Artery :   The right coronary artery is a large sized, dominant vessel that serves a large PDA and a medium   posterolateral branch. The entire system shows mild diffuse atherosclerosis.   CORONARY GRAFTS:   LIMA Graft:   LIMA to left anterior descending is a widely patent and healthy bypass graft that touches down to the mid   left anterior descending serving the mid to apical vessel with brisk and unobstructed flow. There is a   long segment of patent stent in the mid to distal vessel.   IMPRESSION:   1. Severe, symptomatic aortic stenosis with a calculated aortic valve area of 0.77 cmÂ².   2. Well compensated coronary artery disease with multiple patent left anterior descending stents, a   widely patent LIMA to left anterior descending bypass graft, and mild residual coronary artery disease   otherwise.   3. Normal left and right heart catheterization pressures otherwise without evidence of pulmonary   hypertension or volume overload.       CT TAVR   arrative & Impression   CTA Protocol: TAVR     CLINICAL HISTORY: Aortic Stenosis.     COMMENT:     1. PROCEDURE: The patient was brought to the CT prep room in stable condition.  The patient was n.p.o. for 4 hours. An 18-gauge Intracath IV was started in the  right antecubital space. The patient's heart rate and blood pressure were  recorded. A TAVR protocol multidetector CT of the heart and aorta was then  performed in 3 phases includin). Noncontrast CT of the chest. 2).  Retrospective ECG gated CTA  of the heart, 3). Helical non gated CTA of the  chest, abdomen and pelvis. The CT angiogram was performed utilizing 100 ml of  Isovue 370 iodine contrast administered intravenously. One or more dose  reduction techniques were utilized for this examination. Postprocessing for 3D  evaluation was completed and sent to [employer] 3D workstation for  interpretation. The patient tolerated the procedure well and was discharged in  stable condition. The overall quality of the scan was good.     2. CARDIAC FINDINGS:     CORONARY ARTERIES: Three-vessel coronary atherosclerosis.  Evidence of prior  percutaneous coronary intervention.  Evidence of prior LIMA-left anterior  descending coronary artery aortocoronary bypass graft.  CACS: Not applicable     CARDIAC STRUCTURE:  Left Ventricle: Normal cavity size, mild concentric hypertrophy. Normal systolic  function. Normal wall motion.  Septal motion consistent with a  postoperative state.             Left Atrium: Mildly  dilated              Left Atrial Appendage: Patent  Right Ventricle: Normal cavity size and systolic  function               Right Atrium: Normal in size   Atrial septum: Patent foramen ovale      SVC IVC: Normal connections         Mitral Valve: Mildly thickened.  Mild mitral annular  calcification.             Tricuspid Valve: Not well seen               Pericardium: Intact     AORTIC VALVE:  Description: Tricuspid aortic valve. Thickened and calcified cusps with  decreased systolic excursion.  Aortic valve area measures 1.1 sq cm by direct  planimetry  Aortic Valve Calcium Score: 1894  Subvalvular LVOT Calcium: None  Aortic Root: Atherosclerotic     TAVR AORTIC VALVE MEASUREMENTS: Phase: 30     Aortic valvular annulus: Diameter: Max 34 mm. Min 24 mm. Area: 631 mm2.  Perimeter: 92 mm.  Sinotubular Junction Diameter: Max 34 mm. Min 31 mm. Area 852 mm2. Perimeter 105  mm.  Sinus of Valsalva Diameter: Max 36 mm. Min 34 mm Avg 35 . Height 25 mm.  LVOT: 5 mm below  annulus. Diameter: Max 35 mm. Min 24 mm. Area 627 mm2.  Perimeter 93 mm.  Left coronary artery height: 14 mm.  Right coronary artery height: 12 mm.  Ascending thoracic aorta maximum diameter: 39 x 37 mm.  Optimal Fluoroscopic CR angle: Tamazight 13 CRA 10 degrees.  Aortic Neck angle: 60 degrees.     SUMMARY:  1. Tricuspid aortic valve. Cine and tomographic imaging features are consistent  with aortic stenosis. Please see body of the report for detailed measurements.  2. Preserved biventricular systolic function.  3. Three-vessel coronary atherosclerosis.  Evidence of prior percutaneous  coronary intervention.  Evidence of prior LIMA-left anterior descending  aortocoronary bypass graft.  Native vessel patency/stenosis severity could not  be assessed in this study.  Recommend correlation with invasive angiography as  appropriate.     Cardiac interpretation by Wero Guzman M.D. on       Assessment:    Aortic Stenosis     Echo personally reviewed by Dr Suh   Peak Velocity 4.3 m/s, Mean Gradient 41 mmHg, BIANCA 0.9 cm2, EF 55%, Calcium Score 1894.    NYHA II  Class I: No limitation of physical activity. Ordinary physical activity does not cause symptoms of HF.  Class II: Slight limitation of physical activity. Comfortable at rest, but ordinary physical activity results in symptoms of HF.  Class III: Marked limitation of physical activity. Comfortable at rest, but less than ordinary activity causes symptoms of HF.  Class IV: Symptoms occur even at rest; discomfort with any physical activity. Unable to carry on any physical activity without symptoms of HF.    No history of syncopal or presyncopal episodes.    Patient has not had recent hospitalization for CHF.          Patients STS Risk for Surgical AVR is   **Preliminary risk based on current available patient data**      Procedure Type: Isolated AVR   PERIOPERATIVE OUTCOME ESTIMATE %   Operative Mortality 3.9%   Morbidity & Mortality 17.3%   Stroke 2.61%   Renal Failure  3.39%   Reoperation 4.44%   Prolonged Ventilation 8.97%   Deep Sternal Wound Infection 0.229%   Long Hospital Stay (>14 days) 6.79%   Short Hospital Stay (<6 days)* 27%          Procedure Type: CABG + AVR   PERIOPERATIVE OUTCOME ESTIMATE %   Operative Mortality 5.45%   Morbidity & Mortality 18.8%   Stroke 1.35%   Renal Failure 4%   Reoperation 5.26%   Prolonged Ventilation 12.2%   Deep Sternal Wound Infection 0.454%   Long Hospital Stay (>14 days) 11.1%   Short Hospital Stay (<6 days)* 19.8%          .  Patient passed Frailty Exam.  Patient risk per MD assessment is high due to age and co morbidities and hostile chest .        Plan   Recommend proceeding with TAVR.  Patient is in agreement based on a personal decision to increase quality of life        Tentative surgery date of 5/20/24 for TAVR  was offered and accepted by patient.    He will complete his PATS today , EKG completed in the office , and cardiac surgery was seen as well today .   He will need dental clearance and carotid ultrasounds           I have discussed with the patient and family the rationale for procedures as well as possible alternatives.  We discussed potential risks and complications associated with this procedure. The risks include, but are not limited to: bleeding, infection, arrhythmias, myocardial infarction, para-valvular insufficiency,  stroke, death, renal as well as pulmonary insufficiency and the possibility of blood transfusions,  permanent pacemaker placement intra-aortic balloon placement,  assist device placement, cardiac wire perforation, and requirement of CPB.   Time was spent educating the patient about their heart disease diagnosis and treatment options. All questions and concerns were addressed.The patient understands and agrees to proceed.      By signing my name below, I, KIMBERLI Malik attest that this documentation has anette prepared under the direction and in the presence of KIMBERLI Dalton MD        I, Win Suh DO, personally performed the services described in this documentation as described by UVALDO in my presence, and it is accurate and complete.    DO Win Parkinson D.O., [facility]

## 2024-05-14 ENCOUNTER — DOCUMENTATION (OUTPATIENT)
Dept: CARDIOTHORACIC SURGERY | Facility: CLINIC | Age: 84
End: 2024-05-14
Payer: MEDICARE

## 2024-05-14 DIAGNOSIS — Z95.2 S/P TAVR (TRANSCATHETER AORTIC VALVE REPLACEMENT): Primary | ICD-10-CM

## 2024-05-14 PROBLEM — Z00.6 ENCOUNTER FOR EXAMINATION FOR NORMAL COMPARISON AND CONTROL IN CLINICAL RESEARCH PROGRAM: Status: ACTIVE | Noted: 2024-05-13

## 2024-05-14 PROBLEM — I35.0 AORTIC VALVE STENOSIS: Status: ACTIVE | Noted: 2024-05-13

## 2024-05-14 LAB
ABO + RH BLD: NORMAL
BLD GP AB SCN SERPL QL: NEGATIVE
D AG BLD QL: POSITIVE
LABORATORY COMMENT REPORT: NORMAL
SPECIMEN EXP DATE BLD: NORMAL

## 2024-05-14 NOTE — PROGRESS NOTES
Pt teaching started during end of office visit.   Name: Daljit Girard Jr  : 1940  MRN: [id number]    Pt support/ identified as son.      TC pt identified.   Pt given folder with pre operative scrub schedule, mupirocin schedule, dental clearance form, pre-operative medication schedule, and testing still needed in writing. Discussed pending pre-op testing and advised pt that our schedulers would be calling them with updated appt reminders for testing as indicated on their form. Pt informed that pre admission testing lab work must be performed at [facility] within 30 days of scheduled procedure. Informed pt that they will need to see their dentist for dental clearance and that the form will need to be returned to the office via fax, email, or brought to an upcoming visit. Four CHG packets given to pt. CHG schedule, method, and purpose discussed with pt. Mupirocin nasal ointment purpose and administration method demonstrated and discussed with pt. Informed pt that this will be called into their pharmacy at the end of their visit today. Pre-operative medications to hold discussed with pt per [facility] written directions.  Inpatient post op course discussed. Pt informed of possible length of CTSD stay, early mobility initiative with nursing, pain control, and hemodynamic monitoring devices. Discussed CardioSmart heart failure daily tracker tool and AHA self-check plan with pt. Advised pt to utilize tool and and self-check plan pre and post procedure as needed for symptom management and prevention of re-admission.  Encouraged pt to call [phone] with any questions moving forward.   Kianna Ramires RN

## 2024-05-15 ENCOUNTER — DOCUMENTATION (OUTPATIENT)
Dept: CARDIOTHORACIC SURGERY | Facility: CLINIC | Age: 84
End: 2024-05-15
Payer: MEDICARE

## 2024-05-15 LAB — MICROORGANISM SPEC CULT: NORMAL

## 2024-05-15 NOTE — PROGRESS NOTES
Daljit TOBI Girard Jr ([id number])  1940  5/15/2024       [place] Cardiomyopathy Questionnaire (KCCQ-12)    The following questions refer to your heart failure and how it may affect your life. Please read and complete the following  questions. There are no right or wrong answers. Please mehdi the answer that best applies to you.      1. Heart failure affects different people in different ways. Some feel shortness of breath while others feel fatigue. Please  indicate how much you are limited by heart failure (shortness of breath or fatigue) in your ability to do the following  activities over the past 2 weeks.    Activity     Extremely Limited    (1) Quite a bit Limited    (2) Moderately Limited    (3) Slightly Limited    (4) Not At All Limited    (5) Limited for other reasons or do not do the activity   (6) SCORE   A. Showering/bathing         Not At All Limited (5)   B. Walking 1 matilde on level ground       Not At All Limited (5)*     C. Hurrying or jogging  (as if to catch a bus)         Slightly Limited (4)         2. Over the past 2 weeks, how many times did you have swelling in your feet, ankles or legs when you woke up in the morning?      Every morning    (1) 3 or more times  per week but  not every day  (2) 1-2 times per week     (3) Less than once a week     (4) Never over the past 2 weeks     (5)   SCORE          Never over the past 2 weeks  (5)          3. Over the past 2 weeks, on average, how many times has fatigue limited your ability to do what you wanted?    All of  the time    (1) Several times  per day    (2) At least  once a day    (3) 3 or more times  per week but  not every day  (4) 1-2 times  per week    (5) Less than  once a week    (6) Never over the  past 2 weeks    (7)     SCORE          Several times per day (2)         4. Over the past 2 weeks, on average, how many times has shortness of breath limited your ability to do what you wanted?    All of  the time    (1) Several  times  per day    (2) At least  once a day    (3) 3 or more times  per week but  not every day  (4) 1-2 times  per week    (5) Less than  once a week    (6) Never over the  past 2 weeks    (7)     SCORE            3 or more times per week but not every day (4         5. Over the past 2 weeks, on average, how many times have you been forced to sleep sitting up in a chair or with at  least 3 pillows to prop you up because of shortness of breath?      Every morning    (1) 3 or more times  per week but  not every day  (2) 1-2 times per week     (3) Less than once a week     (4) Never over the past 2 weeks     (5)     SCORE          Never over the past 2 weeks  (5)          6. Over the past 2 weeks, how much has your heart failure limited your enjoyment of life?    It has extremely  limited my enjoyment  of life    (1) It has limited my  enjoyment of life  quite a bit    (2) It has moderately  limited my enjoyment  of life    (3) It has slightly  limited my enjoyment  of life    (4) It has not limited  my enjoyment  of life at all    (5)     SCORE        It has not limited my enjoyment of life at all (5)            7. If you had to spend the rest of your life with your heart failure the way it is right now, how would you feel about this?    Not at all  satisfied    (1) Mostly  dissatisfied    (2) Somewhat  satisfied    (3) Mostly  satisfied    (4) Completely  satisfied    (5)   SCORE          Somewhat satisfied (3)         8. How much does your heart failure affect your lifestyle? Please indicate how your heart failure may have limited your  participation in the following activities over the past 2 weeks.        Activity     Severely Limited    (1) Limited  Quite a bit   (2) Moderately Limited    (3) Slightly Limited    (4) Did not limit at all    (5) Does not apply or did not do for other reasons  (6)     SCORE   A.  Hobbies, recreational  activities         Does not apply or did not do for other reasons (6)    B.  Working or doing  household chores         Did not limit at all (5)   C. Visiting family or  friends out of your  home           Did not limit at all (5)     Essential Frailty Test (EFT)     The following baseline frailty exams were completed:        Frailty Item   Scoring System Patient's Score    Hemoglobin:      Hemoglobin   Date Value Ref Range Status   05/13/2024 14.1 13.7 - 17.5 g/dL Final     Males:     >= 13g/dl = 0 points    < 13g/dl = 1 point       Females:     >= 12g/dl = 0 points    < 12g/dl = 1 point      MALE: = or >13g/dl = 0 points      Albumin:      Lab Results   Component Value Date    ALBUMIN 4.3 05/13/2024        >=3.5 = 0 points    <3.5 = 1 points      > or = 3.5 = 0 points      5 Chair Rises  < 15 seconds = 0 points    >= 15 seconds = 1 point    Unable to complete= 2 points   < 15 seconds = 0 points        Cognitive Impairment Test:          3-word recall   No cognitive impairment = 0 points   Cognitive Impairment = 1 point    Cognitive Impairment = 1 point     CALCULATE SCORE:    Frailty = >=3/5 Items.    1-2= Passed Frailty Test   3-5= Failed Frailty Exam  1-2= Passed Frailty Test  ·       _________________________________________________________________      5-Meter Walk Test- ONLY REQUIRED FOR TAVR     (Walk 1-3 are averaged, > 6 seconds= Frail, < 6 seconds= Not Frail)          ?     5M Walk 1: ___5.3___s/5m   5M Walk 2: __4.7____s/5m   5M Walk 3: __5.2____s/5m      5M Walk AVG: __< 6 seconds= Not Frail__ s/5m      RESULT:  < 6 seconds= Not Frail     Utilized walking aid?  NO     Notes: AVG 5.1      ?

## 2024-05-16 ENCOUNTER — PRE-ADMISSION TESTING (OUTPATIENT)
Dept: PREADMISSION TESTING | Age: 84
End: 2024-05-16
Payer: MEDICARE

## 2024-05-16 VITALS — WEIGHT: 220 LBS | HEIGHT: 69 IN | BODY MASS INDEX: 32.58 KG/M2

## 2024-05-16 RX ORDER — COLCHICINE 0.6 MG/1
0.6 TABLET ORAL AS NEEDED
COMMUNITY

## 2024-05-16 NOTE — PRE-PROCEDURE INSTRUCTIONS
1. We will call you between 3 pm and 7 pm on May 17, 2024 to determine that arrival time for your procedure. If you do not hear by 6PM. Please call [phone] for arrival time.     2. Please report to Main Entrance near Parking lot A, walk into main lobby and report to the admission desk on the first floor on the day of your procedure.    3. Please follow the following fasting guidelines:     No solid food EIGHT HOURS prior to arrival time on day of surgery.  6 ounces of clear liquids, meaning water or PLAIN black coffee WITHOUT any milk, cream, sugar, or sweetener are permitted up to TWO HOURS prior to arrival at the hospital.    4. Please take ONLY the following medications with a sip of water on the morning of your procedure: (populate names and/or NONE)    Follow Surgeons Instructions  Advised to bring medication list    5. Other Instructions: You may brush your teeth the morning of the procedure. Rinse and spit, do not swallow.  Bring a list of your medications with dosages.  Use surgical wash as directed.     6. If you develop a cold, cough, fever, rash, or other symptom prior to the day of the procedure, please report it to your physician immediately.    7. If you need to cancel the procedure for any reason, please contact your physician.    8. Make arrangements to have safe transportation home accompanied by a responsible adult. If you have not arranged safe transportation home, your surgery will be cancelled. Safe transportation may include private vehicle, ride-share service, taxi and public transportation when accompanied by a responsible adult who will assist you home. A responsible adult is someone known to you and does not include the taxi, ride-share or public transit drive transporting you.    9.  If it is medically necessary for you to have a longer stay, you will be informed as soon as the decision is made.    10. Only bring essential items to the hospital.  Do not wear or bring anything of value  to the hospital including jewelry of any kind, money, or wallet. Do not wear make-up or contact lenses.  DO NOT BRING MEDICATIONS FROM HOME unless instructed to do so. DO bring your hearing aids, glasses, and a case    11. No lotion, creams, powders, or oils on skin the morning of procedure     12. Dress in comfortable clothes.    13.  If instructed, please bring a copy of your Advanced Directive (Living Will/Durable Power of ) on the day of your procedure.     14. Ensuring your safety at all times is a very important part of our [facility] Culture of Safety. After having surgery and sedation, you are at risk for falling and balance issues. Although you may feel awake, the effects of the medication can last up to 24 hours after anesthesia. If you need to use the bathroom during your recovery period, nursing staff will escort you there and stay with you to ensure your safety.    15. Refrain from drinking alcohol and smoking cigarettes for 24 hours prior to surgery.    16. Shower with antibacterial soap (Dial) the night before and morning of your procedure.  If your procedure indicates the need for CHG antiseptic wash (Bactoshield or Hibiclens), please use this instead and follow instructions as discussed at the time of your Pre-Admission Testing phone interview or visit.    Above instructions reviewed with patient and patient acknowledges understanding.    Form explained by: Usha Ramirez RN

## 2024-05-17 ENCOUNTER — APPOINTMENT (OUTPATIENT)
Dept: LAB | Facility: HOSPITAL | Age: 84
DRG: 267 | End: 2024-05-17
Attending: NURSE PRACTITIONER
Payer: MEDICARE

## 2024-05-17 ENCOUNTER — HOSPITAL ENCOUNTER (OUTPATIENT)
Dept: RADIOLOGY | Facility: HOSPITAL | Age: 84
Discharge: HOME | DRG: 267 | End: 2024-05-17
Attending: NURSE PRACTITIONER
Payer: MEDICARE

## 2024-05-17 DIAGNOSIS — I25.10 CORONARY ARTERY DISEASE, UNSPECIFIED VESSEL OR LESION TYPE, UNSPECIFIED WHETHER ANGINA PRESENT, UNSPECIFIED WHETHER NATIVE OR TRANSPLANTED HEART: ICD-10-CM

## 2024-05-17 DIAGNOSIS — R09.89 CAROTID BRUIT, UNSPECIFIED LATERALITY: ICD-10-CM

## 2024-05-17 DIAGNOSIS — E03.9 HYPOTHYROIDISM, UNSPECIFIED TYPE: ICD-10-CM

## 2024-05-17 LAB
BILIRUB UR QL STRIP.AUTO: NEGATIVE MG/DL
CLARITY UR REFRACT.AUTO: CLEAR
COLOR UR AUTO: YELLOW
GLUCOSE UR STRIP.AUTO-MCNC: NEGATIVE MG/DL
HGB UR QL STRIP.AUTO: NEGATIVE
KETONES UR STRIP.AUTO-MCNC: NEGATIVE MG/DL
LEUKOCYTE ESTERASE UR QL STRIP.AUTO: NEGATIVE
NITRITE UR QL STRIP.AUTO: NEGATIVE
PH UR STRIP.AUTO: 7 [PH]
PROT UR QL STRIP.AUTO: NEGATIVE
SP GR UR REFRACT.AUTO: 1.01
T4 FREE SERPL-MCNC: 0.91 NG/DL (ref 0.58–1.64)
TSH SERPL DL<=0.05 MIU/L-ACNC: 15.07 MIU/L (ref 0.34–5.6)
UROBILINOGEN UR STRIP-ACNC: 0.2 EU/DL

## 2024-05-17 PROCEDURE — 81003 URINALYSIS AUTO W/O SCOPE: CPT

## 2024-05-17 PROCEDURE — 36415 COLL VENOUS BLD VENIPUNCTURE: CPT

## 2024-05-17 PROCEDURE — 93880 EXTRACRANIAL BILAT STUDY: CPT

## 2024-05-17 PROCEDURE — 84439 ASSAY OF FREE THYROXINE: CPT

## 2024-05-17 PROCEDURE — 84443 ASSAY THYROID STIM HORMONE: CPT

## 2024-05-20 ENCOUNTER — APPOINTMENT (INPATIENT)
Dept: RADIOLOGY | Facility: HOSPITAL | Age: 84
DRG: 267 | End: 2024-05-20
Attending: THORACIC SURGERY (CARDIOTHORACIC VASCULAR SURGERY)
Payer: MEDICARE

## 2024-05-20 ENCOUNTER — APPOINTMENT (INPATIENT)
Dept: CARDIOLOGY | Facility: HOSPITAL | Age: 84
DRG: 267 | End: 2024-05-20
Attending: THORACIC SURGERY (CARDIOTHORACIC VASCULAR SURGERY)
Payer: MEDICARE

## 2024-05-20 ENCOUNTER — ANESTHESIA EVENT (INPATIENT)
Dept: OPERATING ROOM | Facility: HOSPITAL | Age: 84
DRG: 267 | End: 2024-05-20
Payer: MEDICARE

## 2024-05-20 ENCOUNTER — HOSPITAL ENCOUNTER (INPATIENT)
Facility: HOSPITAL | Age: 84
LOS: 1 days | Discharge: HOME | DRG: 267 | End: 2024-05-21
Attending: THORACIC SURGERY (CARDIOTHORACIC VASCULAR SURGERY) | Admitting: THORACIC SURGERY (CARDIOTHORACIC VASCULAR SURGERY)
Payer: MEDICARE

## 2024-05-20 ENCOUNTER — PROCEDURE PASS (OUTPATIENT)
Age: 84
End: 2024-05-20
Payer: MEDICARE

## 2024-05-20 ENCOUNTER — ANESTHESIA (INPATIENT)
Dept: OPERATING ROOM | Facility: HOSPITAL | Age: 84
DRG: 267 | End: 2024-05-20
Payer: MEDICARE

## 2024-05-20 ENCOUNTER — APPOINTMENT (INPATIENT)
Dept: RADIOLOGY | Facility: HOSPITAL | Age: 84
DRG: 267 | End: 2024-05-20
Attending: PHYSICIAN ASSISTANT
Payer: MEDICARE

## 2024-05-20 DIAGNOSIS — I35.0 AORTIC VALVE STENOSIS: ICD-10-CM

## 2024-05-20 DIAGNOSIS — I35.0 AORTIC VALVE STENOSIS, ETIOLOGY OF CARDIAC VALVE DISEASE UNSPECIFIED: ICD-10-CM

## 2024-05-20 DIAGNOSIS — Z00.6 ENCOUNTER FOR EXAMINATION FOR NORMAL COMPARISON AND CONTROL IN CLINICAL RESEARCH PROGRAM: ICD-10-CM

## 2024-05-20 DIAGNOSIS — I35.0 NONRHEUMATIC AORTIC VALVE STENOSIS: Primary | ICD-10-CM

## 2024-05-20 PROBLEM — I25.10 CORONARY ARTERY DISEASE, UNSPECIFIED VESSEL OR LESION TYPE, UNSPECIFIED WHETHER ANGINA PRESENT, UNSPECIFIED WHETHER NATIVE OR TRANSPLANTED HEART: Status: ACTIVE | Noted: 2024-05-20

## 2024-05-20 LAB
ABO + RH BLD: NORMAL
ANION GAP SERPL CALC-SCNC: 12 MEQ/L (ref 3–15)
ANION GAP SERPL CALC-SCNC: 6 MEQ/L (ref 3–15)
AORTIC VALVE MEAN VELOCITY: 1.29 M/S
AORTIC VALVE VELOCITY TIME INTEGRAL: 35.3 CM
APTT PPP: 31 SEC (ref 23–35)
ATRIAL RATE: 62
AV MEAN GRADIENT: 8 MMHG
AV PEAK GRADIENT: 15 MMHG
AV PEAK VELOCITY-S: 1.92 M/S
AV VALVE AREA INDEX: 1.5
AV VALVE AREA: 2.09 CM2
AV VELOCITY RATIO: 0.73
AVA (VTI): 3.29 CM2
BASE EXCESS BLDA CALC-SCNC: 0.9 MEQ/L
BSA FOR ECHO PROCEDURE: 2.2 M2
BUN SERPL-MCNC: 17 MG/DL (ref 7–25)
BUN SERPL-MCNC: 18 MG/DL (ref 7–25)
CA-I BLD-SCNC: 1.18 MMOL/L (ref 1.15–1.27)
CA-I BLD-SCNC: 1.19 MMOL/L (ref 1.15–1.27)
CA-I BLD-SCNC: 1.24 MMOL/L (ref 1.15–1.27)
CALCIUM SERPL-MCNC: 8.7 MG/DL (ref 8.6–10.3)
CALCIUM SERPL-MCNC: 9.5 MG/DL (ref 8.6–10.3)
CHLORIDE BLDA-SCNC: 105 MEQ/L (ref 98–109)
CHLORIDE SERPL-SCNC: 104 MEQ/L (ref 98–107)
CHLORIDE SERPL-SCNC: 107 MEQ/L (ref 98–107)
CO2 BLDA-SCNC: 25 MEQ/L (ref 22–32)
CO2 SERPL-SCNC: 23 MEQ/L (ref 21–31)
CO2 SERPL-SCNC: 26 MEQ/L (ref 21–31)
CREAT SERPL-MCNC: 0.9 MG/DL (ref 0.7–1.3)
CREAT SERPL-MCNC: 0.9 MG/DL (ref 0.7–1.3)
CROSSMATCH: NORMAL
D AG BLD QL: POSITIVE
DOP CALC LVOT STROKE VOLUME: 116.21 ML
EGFRCR SERPLBLD CKD-EPI 2021: >60 ML/MIN/1.73M*2
EGFRCR SERPLBLD CKD-EPI 2021: >60 ML/MIN/1.73M*2
ERYTHROCYTE [DISTWIDTH] IN BLOOD BY AUTOMATED COUNT: 14.1 % (ref 11.6–14.4)
ERYTHROCYTE [DISTWIDTH] IN BLOOD BY AUTOMATED COUNT: 14.1 % (ref 11.6–14.4)
GLUCOSE BLD-MCNC: 102 MG/DL (ref 70–99)
GLUCOSE BLD-MCNC: 117 MG/DL (ref 70–99)
GLUCOSE BLD-MCNC: 124 MG/DL (ref 70–99)
GLUCOSE BLD-MCNC: 81 MG/DL (ref 70–99)
GLUCOSE BLDA-MCNC: 120 MG/DL (ref 70–99)
GLUCOSE SERPL-MCNC: 115 MG/DL (ref 70–99)
GLUCOSE SERPL-MCNC: 138 MG/DL (ref 70–99)
HCO3 BLDA-SCNC: 26 MEQ/L (ref 21–28)
HCT VFR BLD AUTO: 38.3 % (ref 40.1–51)
HCT VFR BLD AUTO: 41.7 % (ref 40.1–51)
HGB BLD-MCNC: 12.7 G/DL (ref 13.7–17.5)
HGB BLD-MCNC: 14 G/DL (ref 13.7–17.5)
HGB BLDA-MCNC: 14.4 G/DL (ref 14–17.5)
INR PPP: 1.2
ISBT CODE: 5100
LABORATORY COMMENT REPORT: NORMAL
LACTATE BLDA-SCNC: 2.1 MMOL/L (ref 0.4–1.6)
LVOT 2D: 2.4 CM
LVOT MG: 3 MMHG
LVOT MV: 0.8 M/S
LVOT PEAK VELOCITY: 1.13 M/S
LVOT PG: 5 MMHG
LVOT STROKE VOLUME INDEX: 52.82 ML/M2
LVOT VTI: 25.7 CM
MAGNESIUM SERPL-MCNC: 2 MG/DL (ref 1.8–2.5)
MAGNESIUM SERPL-MCNC: 2.4 MG/DL (ref 1.8–2.5)
MCH RBC QN AUTO: 30 PG (ref 28–33.2)
MCH RBC QN AUTO: 30.4 PG (ref 28–33.2)
MCHC RBC AUTO-ENTMCNC: 33.2 G/DL (ref 32.2–36.5)
MCHC RBC AUTO-ENTMCNC: 33.6 G/DL (ref 32.2–36.5)
MCV RBC AUTO: 89.3 FL (ref 83–98)
MCV RBC AUTO: 91.6 FL (ref 83–98)
MLH CV ECHO AVA INDEX VELOCITY RATIO: 0.9
P AXIS: 29
PCO2 BLDA: 33 MM HG (ref 35–48)
PDW BLD AUTO: 10.9 FL (ref 9.4–12.4)
PDW BLD AUTO: 11 FL (ref 9.4–12.4)
PH BLDA: 7.47 PH (ref 7.35–7.45)
PHOSPHATE SERPL-MCNC: 2.8 MG/DL (ref 2.4–4.7)
PHOSPHATE SERPL-MCNC: 3.1 MG/DL (ref 2.4–4.7)
PLATELET # BLD AUTO: 229 K/UL (ref 150–350)
PLATELET # BLD AUTO: 259 K/UL (ref 150–350)
PO2 BLDA: 98 MM HG (ref 83–100)
POCT ACT-LR: 134 SEC (ref 116–155)
POCT ACT-LR: 265 SEC (ref 116–155)
POCT ACT-LR: 306 SEC (ref 116–155)
POCT PATIENT TEMPERATURE: 98.6 °F (ref 97–99)
POCT TEST (BLD GAS): ABNORMAL
POCT TEST: ABNORMAL
POCT TEST: NORMAL
POCT TEST: NORMAL
POTASSIUM BLDA-SCNC: 4.7 MEQ/L (ref 3.4–4.5)
POTASSIUM SERPL-SCNC: 3.8 MEQ/L (ref 3.5–5.1)
POTASSIUM SERPL-SCNC: 4.4 MEQ/L (ref 3.5–5.1)
PR INTERVAL: 148
PRODUCT CODE: NORMAL
PRODUCT STATUS: NORMAL
PROTHROMBIN TIME: 15.4 SEC (ref 12.2–14.5)
QRS DURATION: 100
QT INTERVAL: 474
QTC CALCULATION(BAZETT): 481
R AXIS: -39
RBC # BLD AUTO: 4.18 M/UL (ref 4.5–5.8)
RBC # BLD AUTO: 4.67 M/UL (ref 4.5–5.8)
SAO2 % BLDA: 98 % (ref 93–98)
SODIUM BLDA-SCNC: 136 MEQ/L (ref 136–145)
SODIUM SERPL-SCNC: 139 MEQ/L (ref 136–145)
SODIUM SERPL-SCNC: 139 MEQ/L (ref 136–145)
SPECIMEN EXP DATE BLD: NORMAL
T WAVE AXIS: 142
UNIT ABO: NORMAL
UNIT ID: NORMAL
UNIT RH: POSITIVE
VENTRICULAR RATE: 62
WBC # BLD AUTO: 6.22 K/UL (ref 3.8–10.5)
WBC # BLD AUTO: 9.67 K/UL (ref 3.8–10.5)

## 2024-05-20 PROCEDURE — 02RF38Z REPLACEMENT OF AORTIC VALVE WITH ZOOPLASTIC TISSUE, PERCUTANEOUS APPROACH: ICD-10-PCS | Performed by: THORACIC SURGERY (CARDIOTHORACIC VASCULAR SURGERY)

## 2024-05-20 PROCEDURE — 5A1223Z PERFORMANCE OF CARDIAC PACING, CONTINUOUS: ICD-10-PCS | Performed by: THORACIC SURGERY (CARDIOTHORACIC VASCULAR SURGERY)

## 2024-05-20 PROCEDURE — 85347 COAGULATION TIME ACTIVATED: CPT | Performed by: THORACIC SURGERY (CARDIOTHORACIC VASCULAR SURGERY)

## 2024-05-20 PROCEDURE — 25800000 HC PHARMACY IV SOLUTIONS: Performed by: PHYSICIAN ASSISTANT

## 2024-05-20 PROCEDURE — 33361 REPLACE AORTIC VALVE PERQ: CPT | Mod: 62,Q0 | Performed by: INTERNAL MEDICINE

## 2024-05-20 PROCEDURE — 85730 THROMBOPLASTIN TIME PARTIAL: CPT | Performed by: PHYSICIAN ASSISTANT

## 2024-05-20 PROCEDURE — 27200000 HC STERILE SUPPLY: Performed by: THORACIC SURGERY (CARDIOTHORACIC VASCULAR SURGERY)

## 2024-05-20 PROCEDURE — 99221 1ST HOSP IP/OBS SF/LOW 40: CPT | Performed by: ANESTHESIOLOGY

## 2024-05-20 PROCEDURE — 63600000 HC DRUGS/DETAIL CODE: Mod: JZ | Performed by: NURSE PRACTITIONER

## 2024-05-20 PROCEDURE — 25000000 HC PHARMACY GENERAL: Performed by: ANESTHESIOLOGY

## 2024-05-20 PROCEDURE — 36000015 HC OR LEVEL 5 EA ADDL MIN: Performed by: THORACIC SURGERY (CARDIOTHORACIC VASCULAR SURGERY)

## 2024-05-20 PROCEDURE — 71000011 HC PACU PHASE 1 EA ADDL MIN: Performed by: THORACIC SURGERY (CARDIOTHORACIC VASCULAR SURGERY)

## 2024-05-20 PROCEDURE — 63700000 HC SELF-ADMINISTRABLE DRUG: Performed by: PHYSICIAN ASSISTANT

## 2024-05-20 PROCEDURE — 93306 TTE W/DOPPLER COMPLETE: CPT | Mod: 26 | Performed by: INTERNAL MEDICINE

## 2024-05-20 PROCEDURE — 84100 ASSAY OF PHOSPHORUS: CPT | Performed by: PHYSICIAN ASSISTANT

## 2024-05-20 PROCEDURE — 63600000 HC DRUGS/DETAIL CODE: Mod: JZ

## 2024-05-20 PROCEDURE — 36415 COLL VENOUS BLD VENIPUNCTURE: CPT | Performed by: THORACIC SURGERY (CARDIOTHORACIC VASCULAR SURGERY)

## 2024-05-20 PROCEDURE — 71045 X-RAY EXAM CHEST 1 VIEW: CPT

## 2024-05-20 PROCEDURE — 82330 ASSAY OF CALCIUM: CPT | Performed by: PHYSICIAN ASSISTANT

## 2024-05-20 PROCEDURE — 63600000 HC DRUGS/DETAIL CODE: Mod: JZ | Performed by: PHYSICIAN ASSISTANT

## 2024-05-20 PROCEDURE — C1894 INTRO/SHEATH, NON-LASER: HCPCS | Performed by: THORACIC SURGERY (CARDIOTHORACIC VASCULAR SURGERY)

## 2024-05-20 PROCEDURE — C1725 CATH, TRANSLUMIN NON-LASER: HCPCS | Performed by: THORACIC SURGERY (CARDIOTHORACIC VASCULAR SURGERY)

## 2024-05-20 PROCEDURE — 25000000 HC PHARMACY GENERAL: Performed by: THORACIC SURGERY (CARDIOTHORACIC VASCULAR SURGERY)

## 2024-05-20 PROCEDURE — 83735 ASSAY OF MAGNESIUM: CPT | Performed by: PHYSICIAN ASSISTANT

## 2024-05-20 PROCEDURE — 63600105 HC IODINE BASED CONTRAST: Performed by: THORACIC SURGERY (CARDIOTHORACIC VASCULAR SURGERY)

## 2024-05-20 PROCEDURE — 37000002 HC ANESTHESIA MAC: Performed by: THORACIC SURGERY (CARDIOTHORACIC VASCULAR SURGERY)

## 2024-05-20 PROCEDURE — 93010 ELECTROCARDIOGRAM REPORT: CPT | Performed by: INTERNAL MEDICINE

## 2024-05-20 PROCEDURE — 21400000 HC ROOM AND CARE CCU/INTERMEDIATE

## 2024-05-20 PROCEDURE — 71000001 HC PACU PHASE 1 INITIAL 30MIN: Performed by: THORACIC SURGERY (CARDIOTHORACIC VASCULAR SURGERY)

## 2024-05-20 PROCEDURE — 86923 COMPATIBILITY TEST ELECTRIC: CPT

## 2024-05-20 PROCEDURE — 85610 PROTHROMBIN TIME: CPT | Performed by: PHYSICIAN ASSISTANT

## 2024-05-20 PROCEDURE — 33361 REPLACE AORTIC VALVE PERQ: CPT | Mod: 62,Q0 | Performed by: THORACIC SURGERY (CARDIOTHORACIC VASCULAR SURGERY)

## 2024-05-20 PROCEDURE — 27800000 HC SUPPLY/IMPLANTS: Performed by: THORACIC SURGERY (CARDIOTHORACIC VASCULAR SURGERY)

## 2024-05-20 PROCEDURE — 80048 BASIC METABOLIC PNL TOTAL CA: CPT | Performed by: PHYSICIAN ASSISTANT

## 2024-05-20 PROCEDURE — C1769 GUIDE WIRE: HCPCS | Performed by: THORACIC SURGERY (CARDIOTHORACIC VASCULAR SURGERY)

## 2024-05-20 PROCEDURE — 25800000 HC PHARMACY IV SOLUTIONS: Performed by: ANESTHESIOLOGY

## 2024-05-20 PROCEDURE — 63600000 HC DRUGS/DETAIL CODE: Mod: JZ | Performed by: ANESTHESIOLOGY

## 2024-05-20 PROCEDURE — 93306 TTE W/DOPPLER COMPLETE: CPT

## 2024-05-20 PROCEDURE — C1760 CLOSURE DEV, VASC: HCPCS | Performed by: THORACIC SURGERY (CARDIOTHORACIC VASCULAR SURGERY)

## 2024-05-20 PROCEDURE — 82803 BLOOD GASES ANY COMBINATION: CPT | Performed by: THORACIC SURGERY (CARDIOTHORACIC VASCULAR SURGERY)

## 2024-05-20 PROCEDURE — 85027 COMPLETE CBC AUTOMATED: CPT | Performed by: PHYSICIAN ASSISTANT

## 2024-05-20 PROCEDURE — 36000005 HC OR LEVEL 5 INITIAL 30MIN: Performed by: THORACIC SURGERY (CARDIOTHORACIC VASCULAR SURGERY)

## 2024-05-20 PROCEDURE — 63600000 HC DRUGS/DETAIL CODE: Mod: JW | Performed by: ANESTHESIOLOGY

## 2024-05-20 PROCEDURE — 63600000 HC DRUGS/DETAIL CODE: Performed by: ANESTHESIOLOGY

## 2024-05-20 DEVICE — *T* 18 FR. MANTA VASCULAR CLOSURE DEVICE: Type: IMPLANTABLE DEVICE | Site: GROIN | Status: FUNCTIONAL

## 2024-05-20 DEVICE — DEVICE CLOSURE 6FR ANGIOSEAL VIP: Type: IMPLANTABLE DEVICE | Site: GROIN | Status: FUNCTIONAL

## 2024-05-20 DEVICE — IMPLANTABLE DEVICE: Type: IMPLANTABLE DEVICE | Site: HEART | Status: FUNCTIONAL

## 2024-05-20 RX ORDER — SENNOSIDES 8.6 MG/1
1 TABLET ORAL 2 TIMES DAILY
Status: DISCONTINUED | OUTPATIENT
Start: 2024-05-20 | End: 2024-05-21 | Stop reason: HOSPADM

## 2024-05-20 RX ORDER — DEXTROSE 50 % IN WATER (D50W) INTRAVENOUS SYRINGE
25 AS NEEDED
Status: DISCONTINUED | OUTPATIENT
Start: 2024-05-20 | End: 2024-05-20 | Stop reason: HOSPADM

## 2024-05-20 RX ORDER — FAMOTIDINE 20 MG/1
20 TABLET, FILM COATED ORAL 2 TIMES DAILY
Status: DISCONTINUED | OUTPATIENT
Start: 2024-05-20 | End: 2024-05-21 | Stop reason: HOSPADM

## 2024-05-20 RX ORDER — ONDANSETRON HYDROCHLORIDE 2 MG/ML
4 INJECTION, SOLUTION INTRAVENOUS
Status: DISCONTINUED | OUTPATIENT
Start: 2024-05-20 | End: 2024-05-20 | Stop reason: HOSPADM

## 2024-05-20 RX ORDER — LANOLIN ALCOHOL/MO/W.PET/CERES
400 CREAM (GRAM) TOPICAL 2 TIMES DAILY
Status: DISCONTINUED | OUTPATIENT
Start: 2024-05-20 | End: 2024-05-21 | Stop reason: HOSPADM

## 2024-05-20 RX ORDER — PROTAMINE SULFATE 10 MG/ML
INJECTION, SOLUTION INTRAVENOUS AS NEEDED
Status: DISCONTINUED | OUTPATIENT
Start: 2024-05-20 | End: 2024-05-20 | Stop reason: SURG

## 2024-05-20 RX ORDER — HYDRALAZINE HYDROCHLORIDE 20 MG/ML
10 INJECTION INTRAMUSCULAR; INTRAVENOUS ONCE
Status: COMPLETED | OUTPATIENT
Start: 2024-05-20 | End: 2024-05-20

## 2024-05-20 RX ORDER — PROPOFOL 10 MG/ML
INJECTION, EMULSION INTRAVENOUS CONTINUOUS PRN
Status: DISCONTINUED | OUTPATIENT
Start: 2024-05-20 | End: 2024-05-20 | Stop reason: SURG

## 2024-05-20 RX ORDER — IBUPROFEN 200 MG
16-32 TABLET ORAL AS NEEDED
Status: DISCONTINUED | OUTPATIENT
Start: 2024-05-20 | End: 2024-05-20 | Stop reason: HOSPADM

## 2024-05-20 RX ORDER — CHLORHEXIDINE GLUCONATE ORAL RINSE 1.2 MG/ML
15 SOLUTION DENTAL
Status: DISCONTINUED | OUTPATIENT
Start: 2024-05-20 | End: 2024-05-21

## 2024-05-20 RX ORDER — EZETIMIBE 10 MG/1
10 TABLET ORAL NIGHTLY
Status: DISCONTINUED | OUTPATIENT
Start: 2024-05-20 | End: 2024-05-21 | Stop reason: HOSPADM

## 2024-05-20 RX ORDER — IOPAMIDOL 612 MG/ML
INJECTION, SOLUTION INTRAVASCULAR
Status: DISCONTINUED | OUTPATIENT
Start: 2024-05-20 | End: 2024-05-20 | Stop reason: HOSPADM

## 2024-05-20 RX ORDER — POTASSIUM CHLORIDE 20 MEQ/1
20 TABLET, EXTENDED RELEASE ORAL AS NEEDED
Status: DISCONTINUED | OUTPATIENT
Start: 2024-05-20 | End: 2024-05-21 | Stop reason: HOSPADM

## 2024-05-20 RX ORDER — DULOXETIN HYDROCHLORIDE 60 MG/1
60 CAPSULE, DELAYED RELEASE ORAL NIGHTLY
Status: DISCONTINUED | OUTPATIENT
Start: 2024-05-20 | End: 2024-05-21 | Stop reason: HOSPADM

## 2024-05-20 RX ORDER — DEXTROSE 40 %
15-30 GEL (GRAM) ORAL AS NEEDED
Status: DISCONTINUED | OUTPATIENT
Start: 2024-05-20 | End: 2024-05-20 | Stop reason: HOSPADM

## 2024-05-20 RX ORDER — ONDANSETRON HYDROCHLORIDE 2 MG/ML
4 INJECTION, SOLUTION INTRAVENOUS EVERY 8 HOURS PRN
Status: DISCONTINUED | OUTPATIENT
Start: 2024-05-20 | End: 2024-05-21 | Stop reason: HOSPADM

## 2024-05-20 RX ORDER — INSULIN LISPRO 100 [IU]/ML
3-5 INJECTION, SOLUTION INTRAVENOUS; SUBCUTANEOUS
Status: DISCONTINUED | OUTPATIENT
Start: 2024-05-20 | End: 2024-05-21 | Stop reason: HOSPADM

## 2024-05-20 RX ORDER — PANTOPRAZOLE SODIUM 40 MG/1
40 TABLET, DELAYED RELEASE ORAL NIGHTLY
Status: DISCONTINUED | OUTPATIENT
Start: 2024-05-20 | End: 2024-05-20

## 2024-05-20 RX ORDER — SODIUM CHLORIDE, SODIUM GLUCONATE, SODIUM ACETATE, POTASSIUM CHLORIDE AND MAGNESIUM CHLORIDE 30; 37; 368; 526; 502 MG/100ML; MG/100ML; MG/100ML; MG/100ML; MG/100ML
INJECTION, SOLUTION INTRAVENOUS CONTINUOUS PRN
Status: DISCONTINUED | OUTPATIENT
Start: 2024-05-20 | End: 2024-05-20 | Stop reason: SURG

## 2024-05-20 RX ORDER — INSULIN LISPRO 100 [IU]/ML
3 INJECTION, SOLUTION INTRAVENOUS; SUBCUTANEOUS
Status: DISCONTINUED | OUTPATIENT
Start: 2024-05-20 | End: 2024-05-21 | Stop reason: HOSPADM

## 2024-05-20 RX ORDER — POTASSIUM CHLORIDE 20 MEQ/1
20 TABLET, EXTENDED RELEASE ORAL DAILY
Status: COMPLETED | OUTPATIENT
Start: 2024-05-20 | End: 2024-05-21

## 2024-05-20 RX ORDER — FUROSEMIDE 10 MG/ML
40 INJECTION INTRAMUSCULAR; INTRAVENOUS DAILY
Status: COMPLETED | OUTPATIENT
Start: 2024-05-20 | End: 2024-05-21

## 2024-05-20 RX ORDER — ROSUVASTATIN CALCIUM 40 MG/1
40 TABLET, COATED ORAL DAILY
Status: DISCONTINUED | OUTPATIENT
Start: 2024-05-20 | End: 2024-05-21 | Stop reason: HOSPADM

## 2024-05-20 RX ORDER — POTASSIUM CHLORIDE 14.9 MG/ML
20 INJECTION INTRAVENOUS EVERY 8 HOURS PRN
Status: DISCONTINUED | OUTPATIENT
Start: 2024-05-20 | End: 2024-05-21 | Stop reason: HOSPADM

## 2024-05-20 RX ORDER — LEVOTHYROXINE SODIUM 75 UG/1
150 TABLET ORAL
Status: DISCONTINUED | OUTPATIENT
Start: 2024-05-21 | End: 2024-05-21 | Stop reason: HOSPADM

## 2024-05-20 RX ORDER — HYDROMORPHONE HYDROCHLORIDE 1 MG/ML
0.5 INJECTION, SOLUTION INTRAMUSCULAR; INTRAVENOUS; SUBCUTANEOUS
Status: DISCONTINUED | OUTPATIENT
Start: 2024-05-20 | End: 2024-05-20 | Stop reason: HOSPADM

## 2024-05-20 RX ORDER — TAMSULOSIN HYDROCHLORIDE 0.4 MG/1
0.4 CAPSULE ORAL NIGHTLY
Status: DISCONTINUED | OUTPATIENT
Start: 2024-05-20 | End: 2024-05-21 | Stop reason: HOSPADM

## 2024-05-20 RX ORDER — NOREPINEPHRINE BITARTRATE 0.02 MG/ML
INJECTION, SOLUTION INTRAVENOUS CONTINUOUS PRN
Status: DISCONTINUED | OUTPATIENT
Start: 2024-05-20 | End: 2024-05-20 | Stop reason: SURG

## 2024-05-20 RX ORDER — FAMOTIDINE 10 MG/ML
20 INJECTION INTRAVENOUS 2 TIMES DAILY
Status: DISCONTINUED | OUTPATIENT
Start: 2024-05-20 | End: 2024-05-21 | Stop reason: HOSPADM

## 2024-05-20 RX ORDER — DOCUSATE SODIUM 100 MG/1
100 CAPSULE, LIQUID FILLED ORAL 2 TIMES DAILY
Status: DISCONTINUED | OUTPATIENT
Start: 2024-05-20 | End: 2024-05-21 | Stop reason: HOSPADM

## 2024-05-20 RX ORDER — POLYETHYLENE GLYCOL 3350 17 G/17G
17 POWDER, FOR SOLUTION ORAL DAILY
Status: DISCONTINUED | OUTPATIENT
Start: 2024-05-20 | End: 2024-05-21 | Stop reason: HOSPADM

## 2024-05-20 RX ORDER — ASPIRIN 81 MG/1
81 TABLET ORAL DAILY
Status: DISCONTINUED | OUTPATIENT
Start: 2024-05-20 | End: 2024-05-21 | Stop reason: HOSPADM

## 2024-05-20 RX ORDER — MIDAZOLAM HYDROCHLORIDE 2 MG/2ML
INJECTION, SOLUTION INTRAMUSCULAR; INTRAVENOUS AS NEEDED
Status: DISCONTINUED | OUTPATIENT
Start: 2024-05-20 | End: 2024-05-20 | Stop reason: SURG

## 2024-05-20 RX ORDER — ONDANSETRON 4 MG/1
4 TABLET, ORALLY DISINTEGRATING ORAL EVERY 8 HOURS PRN
Status: DISCONTINUED | OUTPATIENT
Start: 2024-05-20 | End: 2024-05-21 | Stop reason: HOSPADM

## 2024-05-20 RX ORDER — LIDOCAINE HYDROCHLORIDE 10 MG/ML
INJECTION, SOLUTION INFILTRATION; PERINEURAL
Status: DISCONTINUED | OUTPATIENT
Start: 2024-05-20 | End: 2024-05-20 | Stop reason: HOSPADM

## 2024-05-20 RX ORDER — BISACODYL 10 MG/1
10 SUPPOSITORY RECTAL AS NEEDED
Status: DISCONTINUED | OUTPATIENT
Start: 2024-05-23 | End: 2024-05-21 | Stop reason: HOSPADM

## 2024-05-20 RX ORDER — ALUMINUM HYDROXIDE, MAGNESIUM HYDROXIDE, AND SIMETHICONE 1200; 120; 1200 MG/30ML; MG/30ML; MG/30ML
30 SUSPENSION ORAL EVERY 4 HOURS PRN
Status: DISCONTINUED | OUTPATIENT
Start: 2024-05-20 | End: 2024-05-21 | Stop reason: HOSPADM

## 2024-05-20 RX ORDER — FENTANYL CITRATE 50 UG/ML
25 INJECTION, SOLUTION INTRAMUSCULAR; INTRAVENOUS EVERY 5 MIN PRN
Status: DISCONTINUED | OUTPATIENT
Start: 2024-05-20 | End: 2024-05-20 | Stop reason: HOSPADM

## 2024-05-20 RX ORDER — CLOPIDOGREL BISULFATE 75 MG/1
75 TABLET ORAL DAILY
Status: DISCONTINUED | OUTPATIENT
Start: 2024-05-21 | End: 2024-05-21 | Stop reason: HOSPADM

## 2024-05-20 RX ORDER — CHLORHEXIDINE GLUCONATE ORAL RINSE 1.2 MG/ML
15 SOLUTION DENTAL ONCE
Status: DISCONTINUED | OUTPATIENT
Start: 2024-05-20 | End: 2024-05-20

## 2024-05-20 RX ORDER — AMOXICILLIN 500 MG/1
500 CAPSULE ORAL 4 TIMES DAILY
COMMUNITY
End: 2024-07-25

## 2024-05-20 RX ORDER — SODIUM CHLORIDE, SODIUM GLUCONATE, SODIUM ACETATE, POTASSIUM CHLORIDE AND MAGNESIUM CHLORIDE 30; 37; 368; 526; 502 MG/100ML; MG/100ML; MG/100ML; MG/100ML; MG/100ML
125 INJECTION, SOLUTION INTRAVENOUS CONTINUOUS
Status: DISCONTINUED | OUTPATIENT
Start: 2024-05-20 | End: 2024-05-21 | Stop reason: HOSPADM

## 2024-05-20 RX ORDER — SODIUM CHLORIDE 9 MG/ML
INJECTION, SOLUTION INTRAVENOUS CONTINUOUS PRN
Status: DISCONTINUED | OUTPATIENT
Start: 2024-05-20 | End: 2024-05-20 | Stop reason: SURG

## 2024-05-20 RX ORDER — ACETAMINOPHEN 325 MG/1
975 TABLET ORAL EVERY 6 HOURS
Status: DISCONTINUED | OUTPATIENT
Start: 2024-05-20 | End: 2024-05-21 | Stop reason: HOSPADM

## 2024-05-20 RX ORDER — POTASSIUM CHLORIDE 20 MEQ/1
40 TABLET, EXTENDED RELEASE ORAL AS NEEDED
Status: DISCONTINUED | OUTPATIENT
Start: 2024-05-20 | End: 2024-05-21 | Stop reason: HOSPADM

## 2024-05-20 RX ORDER — HEPARIN SODIUM 1000 [USP'U]/ML
INJECTION, SOLUTION INTRAVENOUS; SUBCUTANEOUS AS NEEDED
Status: DISCONTINUED | OUTPATIENT
Start: 2024-05-20 | End: 2024-05-20 | Stop reason: SURG

## 2024-05-20 RX ADMIN — FAMOTIDINE 20 MG: 20 TABLET, FILM COATED ORAL at 20:26

## 2024-05-20 RX ADMIN — NOREPINEPHRINE BITARTRATE 2 MCG/MIN: at 10:01

## 2024-05-20 RX ADMIN — DULOXETINE HYDROCHLORIDE 60 MG: 60 CAPSULE, DELAYED RELEASE ORAL at 22:22

## 2024-05-20 RX ADMIN — HEPARIN SODIUM 10000 UNITS: 1000 INJECTION, SOLUTION INTRAVENOUS; SUBCUTANEOUS at 10:00

## 2024-05-20 RX ADMIN — ACETAMINOPHEN 975 MG: 325 TABLET ORAL at 20:25

## 2024-05-20 RX ADMIN — FENTANYL CITRATE 25 MCG: 0.05 INJECTION, SOLUTION INTRAMUSCULAR; INTRAVENOUS at 11:26

## 2024-05-20 RX ADMIN — CEFAZOLIN 2 G: 2 INJECTION, POWDER, FOR SOLUTION INTRAMUSCULAR; INTRAVENOUS at 09:20

## 2024-05-20 RX ADMIN — TAMSULOSIN HYDROCHLORIDE 0.4 MG: 0.4 CAPSULE ORAL at 22:22

## 2024-05-20 RX ADMIN — MAGNESIUM SULFATE HEPTAHYDRATE 2 G: 40 INJECTION, SOLUTION INTRAVENOUS at 11:48

## 2024-05-20 RX ADMIN — PROPOFOL 20 MCG/KG/MIN: 10 INJECTION, EMULSION INTRAVENOUS at 09:20

## 2024-05-20 RX ADMIN — MIDAZOLAM HYDROCHLORIDE 1 MG: 1 INJECTION, SOLUTION INTRAMUSCULAR; INTRAVENOUS at 09:40

## 2024-05-20 RX ADMIN — HYDRALAZINE HYDROCHLORIDE 10 MG: 20 INJECTION INTRAMUSCULAR; INTRAVENOUS at 14:02

## 2024-05-20 RX ADMIN — FUROSEMIDE 40 MG: 10 INJECTION, SOLUTION INTRAMUSCULAR; INTRAVENOUS at 14:56

## 2024-05-20 RX ADMIN — SODIUM CHLORIDE 2.5 MG/HR: 9 INJECTION, SOLUTION INTRAVENOUS at 10:30

## 2024-05-20 RX ADMIN — ACETAMINOPHEN 975 MG: 325 TABLET ORAL at 13:20

## 2024-05-20 RX ADMIN — FENTANYL CITRATE 25 MCG: 0.05 INJECTION, SOLUTION INTRAMUSCULAR; INTRAVENOUS at 11:38

## 2024-05-20 RX ADMIN — POTASSIUM CHLORIDE 20 MEQ: 1500 TABLET, EXTENDED RELEASE ORAL at 20:26

## 2024-05-20 RX ADMIN — ASPIRIN 81 MG: 81 TABLET, COATED ORAL at 14:56

## 2024-05-20 RX ADMIN — PROTAMINE SULFATE 60 MG: 10 INJECTION, SOLUTION INTRAVENOUS at 10:20

## 2024-05-20 RX ADMIN — HEPARIN SODIUM 3000 UNITS: 1000 INJECTION, SOLUTION INTRAVENOUS; SUBCUTANEOUS at 10:08

## 2024-05-20 RX ADMIN — POTASSIUM CHLORIDE 20 MEQ: 1500 TABLET, EXTENDED RELEASE ORAL at 14:56

## 2024-05-20 RX ADMIN — SODIUM CHLORIDE: 9 INJECTION, SOLUTION INTRAVENOUS at 09:20

## 2024-05-20 RX ADMIN — DEXMEDETOMIDINE HYDROCHLORIDE 51.5 MCG: 100 INJECTION, SOLUTION INTRAVENOUS at 09:20

## 2024-05-20 RX ADMIN — EZETIMIBE 10 MG: 10 TABLET ORAL at 22:22

## 2024-05-20 RX ADMIN — CEFAZOLIN 2 G: 2 INJECTION, POWDER, FOR SOLUTION INTRAMUSCULAR; INTRAVENOUS at 17:24

## 2024-05-20 RX ADMIN — MAGNESIUM OXIDE TAB 400 MG (241.3 MG ELEMENTAL MG) 400 MG: 400 (241.3 MG) TAB at 20:25

## 2024-05-20 RX ADMIN — PROTAMINE SULFATE 70 MG: 10 INJECTION, SOLUTION INTRAVENOUS at 10:26

## 2024-05-20 RX ADMIN — ROSUVASTATIN CALCIUM 40 MG: 40 TABLET, FILM COATED ORAL at 14:56

## 2024-05-20 RX ADMIN — HYDRALAZINE HYDROCHLORIDE 10 MG: 20 INJECTION INTRAMUSCULAR; INTRAVENOUS at 11:56

## 2024-05-20 RX ADMIN — SODIUM CHLORIDE, SODIUM GLUCONATE, SODIUM ACETATE, POTASSIUM CHLORIDE AND MAGNESIUM CHLORIDE: 526; 502; 368; 37; 30 INJECTION, SOLUTION INTRAVENOUS at 09:20

## 2024-05-20 ASSESSMENT — COGNITIVE AND FUNCTIONAL STATUS - GENERAL
CLIMB 3 TO 5 STEPS WITH RAILING: 3 - A LITTLE
MOVING TO AND FROM BED TO CHAIR: 3 - A LITTLE
MOVING TO AND FROM BED TO CHAIR: 3 - A LITTLE
WALKING IN HOSPITAL ROOM: 3 - A LITTLE
WALKING IN HOSPITAL ROOM: 3 - A LITTLE
CLIMB 3 TO 5 STEPS WITH RAILING: 3 - A LITTLE
STANDING UP FROM CHAIR USING ARMS: 3 - A LITTLE
STANDING UP FROM CHAIR USING ARMS: 3 - A LITTLE

## 2024-05-20 NOTE — OP NOTE
05/20/24    PRIMARY SURGEON: Joe Campos MD,  PRIMARY CARDIOLOGIST: Win Dos Santos MD    [facility] REGISTRY NUMBER: [id number]    PREOPERATIVE DIAGNOSES:   Past Medical History:   Diagnosis Date    Coronary artery disease     Hypertension     Lipid disorder     Sleep apnea     cpap    Type 2 diabetes mellitus (CMS/HCC)        POSTOPERATIVE DIAGNOSES:   Past Medical History:   Diagnosis Date    Coronary artery disease     Hypertension     Lipid disorder     Sleep apnea     cpap    Type 2 diabetes mellitus (CMS/HCC)        PROCEDURE PERFORMED:   1. U/S guided percutaneous bilateral femoral arterial and venous access.   2. Transfemoral deployment of a 29 mm Crespo TIEN S3 transcatheter aortic valve.   3. Balloon assisted closure with MANTA device of right femoral arteriotomy.   4. Selective angiography of right iliac and femoral arteries.       OPERATIVE INDICATIONS: Daljit Girard Jr. is a 84 y.o. male presented with progressive shortness of breath. The patient underwent an echocardiogram which revealed severe aortic stenosis with peak velocity of 4.2 m/sec, mean gradient of 41 mmHg, and an BIANCA of 0.9 cm2. LVEF was estimated to be 65%. After multidisciplinary review of the clinical history, STS risk score, physical examination and diagnostic studies this patient meets criteria for severe symptomatic valvular disease. Approach and valve sizing was confirmed by CTA measurements in [place]. The risks and benefits of the procedure including but not limited to bleeding, infection, myocardial infarction, stroke, death, renal and pulmonary insufficiency, as well as the possibility of blood transfusion, future revascularization procedures, cardiac wire perforations, rhythm disturbances requiring pacemaker placement were all discussed with the patient and the patient expressed understanding. All questions were answered. The patient elected to proceed with the above proposed procedure.        PROCEDURE:  Following written consent the patient was brought into the operating room and placed supine on the operating table. Time-out was completed in the room including patient identification, procedure and surgical site. Sedation was administered by the anesthesiologist with no known complications. The patient was prepped and draped in the usual standard surgical fashion. Local anesthetic was infiltrated in bilateral groin areas. Femoral access to the bilateral femoral arteries and veins was performed with micropuncture technique under ultrasound and fluoroscopic guidance. Sheaths was placed into the femoral veins bilaterally. A temporary pacemaker was placed through the left femoral venous  sheath. Thresholds were examined and found to be adequate. 6 and 7 F Sheaths were introduced to the right and left femoral arteries.  A .035 J wire was then advanced through the right common femoral artery.  The sheath was then removed. MANTA device was used to measure vessel depth and removed thereafter and an 8-Croatian sheath was then placed. Rim catheter was advanced through the contralateral side over a glide advantage wire and was used to engage the left AMANDA, then the glide advantage wire was advanced across the AMANDA to the SFA, followed by the rim and was exchanged with a Platinum plus wire the was left in place and secured with a hemostat. Through the same sheath, a pigtail catheter was advanced into the aortic root.  Heparin was administered for a target ACT of over 300.  A J wire was advanced to the ascending aorta. A JR4 catheter was placed over the wire. A glide wire was then used to cross the native aortic valve. The catheter was then advanced over the wire into the left ventricle. The wire was exchanged for a Safari wire.   The common femoral sheath was then removed, and the artery was gently dilated with serial dilators. A 16-Croatian Crespo eSheath+ was inserted under direct fluoroscopic visualization. A 29 mm Crespo S-3  valve was brought to the field, inspected and found to be loaded adequately over the Crespo Commander Delivery System. Then it was introduced through the sheath up to the proximal portion of the descending aorta, at which point in time the device was slowly curved along the arch under direct visualization. A prosthetic valve was then placed across the native aortic valve. The position of the valve was confirmed by fluoroscopy. Final aortogram was then done. The valve was then deployed under rapid ventricular pacing. Once this was done, we were satisfied with the valve function. Echocardiogram revealed no perivalvular leak.      Balloon assisted closure: The Crespo Commander Delivery System was taken out, and the eSheath was partially withdrawn. A 8x20mm balloon was advanced through the contralateral side over the platinum plus wire to the external iliac artery, just above the TAVR arteriotomy. Upon removing the eSheath from the groin, the balloon was inflated and the arteriology site was closed with the MANTA closure device. The balloon was deflated, and we performed an angiogram through the shaft of the balloon that did not show any significant extravasation of contrast or dissection of the vessels. The puncture appeared to be hemostatic.     Protamine was given to counteract heparin. The pacemaker wire was removed. Angiogram thought the contralateral 7F arterial femoral sheath was performed, then the sheath was removed and 6F Angioseal was used fr closure. Bilateral venous sheaths were removed as well.     The sponge, needle, and instrument counts were correct at the end of the procedure. The patient awakened and transported to the post anesthesia care unit in a stable condition.     Conclusion: Severe symptomatic AS s/p 29 mm Rebecca-3 Ultra transcatheter aortic valve with no immediate complications.

## 2024-05-20 NOTE — ANESTHESIOLOGIST PRE-PROCEDURE ATTESTATION
Pre-Procedure Patient Identification:  I am the Primary Anesthesiologist and have identified the patient on 05/20/24 at 8:14 AM.   I have confirmed the procedure(s) will be performed by the following surgeon/proceduralist Joe Campos MD.

## 2024-05-20 NOTE — ANESTHESIA PROCEDURE NOTES
Arterial Line:    Start time: 5/20/2024 8:45 AM    An arterial line was placed in the OR for the following indication(s): continuous blood pressure monitoring and blood sampling needed.    A 20 G (size), 1 and 3/4 inch (length), Arrow (type) catheter was placed,   Seldinger technique used, into the Right radial artery, secured by Tegaderm.      Procedure performed using ultrasound guidance.  Image captured and stored.  Image visualization comments: Ultrasound used to visualize the placement of wire and/or needle in appropriate artery.    Events:  patient tolerated procedure well with no complications.    The supervising anesthesiologist was   Performed By: anesthesiologist  Attending: Naeem Logan MD

## 2024-05-20 NOTE — PRE-PROCEDURE NOTE
Pre Cardiac Surgery Note    - Patient was seen and examined at bedside.  - The patient's chart and all data was reviewed.  - The procedure, treatment alternatives, risks and benefits were explained with specific risks discussed.  - The STS Risk Calculator score was discussed with the patient family prior to surgery, where applicable.  - After discussion with shared decision making, an agreement has been reached to proceed with: TAVR TRANSFEMORAL AORTIC VALVE REPLACEMENT    Status of surgery: elective      Preoperative Cardiac Status  No prior myocardiac infarction    Primary coronary symptom for surgery: no coronary symptoms    Heart failure unknown        BNP (pg/mL)   Date/Time Value   05/13/2024 1646 59           Classification-NHYA within 2 weeks: not documented    No cardiogenic shock (w/in 24 hrs)  No cardiac arrest (w/in 24 hrs)    History  No atrial fibrillation    Preoperative medications - CABG specific  Beta blocker within 24 hours of incision: no          Inpatient Beta Blocker Administrations (last 24 hours)       None            Imaging Impressions for Echo and Carotid US:    US CAROTID BILATERAL 05/17/2024    Narrative  EXAM: US CAROTID BILATERAL    CLINICAL HISTORY: I25.10: Atherosclerotic heart disease of native coronary  artery without angina pectoris  R09.89: Other specified symptoms and signs involving the circulatory and  respiratory systems    COMPARISON: 4/24/2015    PROCEDURE: Ultrasound examination of the carotid arteries is performed utilizing  gray scale, color, and pulsed Doppler sonography.    COMMENT:    Right: There is mild plaque noted within the right carotid vessels. There is no  evidence for hemodynamically significant stenosis. The peak systolic velocity  within the right internal carotid artery is 66.9 cm/sec with end-diastolic  velocity of 21.8 cm/sec and ICA/CCA ratio of 1.4. Velocities correlate to less  than 50% stenosis.  There is antegrade flow within the right vertebral  artery.    Left: There is moderate plaque within the left carotid vessels. There is no  evidence for hemodynamically significant stenosis. The peak systolic velocity  within the left internal carotid artery is 148.9 cm/sec with end-diastolic  velocity of 32.7 cm/sec and ICA/CCA ratio of 3.3.  This correlates to less than  50% stenosis. There is antegrade flow within the left vertebral artery.    CAROTID DUPLEX CRITERIA:  Measurement of carotid stenosis is based on velocity  parameters that correlate the residual internal carotid diameter with North  American Symptomatic Carotid Endarterectomy Trial (NASCET)-based stenosis  levels.    Impression  IMPRESSION:  1. Right:  Velocities correlating to less than 50% stenosis.  2. Left:  Velocities correlating to 50-69% stenosis.  3. Bilateral antegrade flow within the vertebral arteries.    TRANSTHORACIC ECHO (TTE) COMPLETE 05/13/2024    Interpretation Summary  The left ventricular cavity size is normal with mildly increased wall thickness and preserved systolic function. Estimated EF 60-65%. No regional wall motion abnormalities. Septal motion consistent with a postoperative state. Indeterminate diastolic function.    The aortic valve is tricuspid. Severe aortic stenosis with a peak velocity of 4.3 m/s and a mean gradient of 41 mmHg measured from the apex. Calculated aortic valve area of 0.9 cm2. Trace regurgitation.    The visualized portions of the aortic root and ascending aorta are normal in size.    The mitral valve is mildly thickened. Mild mitral annular calcification. No stenosis. Mild regurgitation.    The left atrium is normal in size.    The right ventricular cavity is normal with preserved systolic function.    The pulmonic valve is not well seen. There is trace pulmonic regurgitation.    The tricuspid valve is normal in appearance. Trace regurgitation with insufficient regurgitant jet to estimate RVSP.    The right atrium is normal in size.    The IVC is  normal in size and collapses > 50% with inspiration consistent with normal right atrial pressures.    Trace pericardial effusion.    No prior study for comparison.

## 2024-05-20 NOTE — PROGRESS NOTES
Cardiothoracic Intensivist Progress Note       CARDIOTHORACIC     IV dye allergy: no    Post-Operative Day # 0     Subjective     History of Present Illness: Daljit Girard Jr. is a 84 y.o. cisgender male with history of known non-rheumatic aortic valve stenosis prior to OR s/p transfemoral TAVR, chronic DM, and hypothyroidism that are being treated.                              Review of Systems:                                     Constitutional: no acute distress                                                  Eyes: denies difficulty with vision  Ears, nose, mouth, throat, and face: denies oral discomfort                                        Respiratory: denies shortness of breath                                  Cardiovascular: denies chest discomfort                                  Gastrointestinal: denies abdominal pain                                    Genitourinary: denies difficulty voiding                                      Hematologic: denies bleeding issues                                Musculoskeletal: denies muscle pain                                      Neurological: generalized weakness                                   Integumentary: denies rash    Medical History:   Past Medical History:   Diagnosis Date    Coronary artery disease     Hypertension     Lipid disorder     Sleep apnea     cpap    Type 2 diabetes mellitus (CMS/HCC)        Surgical History:   Past Surgical History:   Procedure Laterality Date    COLONOSCOPY      CORONARY ARTERY BYPASS GRAFT      x 5 stents    ROTATOR CUFF REPAIR Bilateral        Prior to Admission medications    Medication Sig Start Date End Date Taking? Authorizing Provider   amoxicillin (AMOXIL) 500 mg capsule Take 500 mg by mouth 4 (four) times a day.   Yes Provider, Cramen Ornelas MD   aspirin 81 mg enteric coated tablet Take 81 mg by mouth nightly.   Yes ProviderCarmen MD   clopidogreL (PLAVIX) 75 mg tablet Take 75 mg by mouth daily.    Yes ProviderCarmen MD   DULoxetine (CYMBALTA) 60 mg capsule Take 60 mg by mouth nightly.   Yes Carmen Lugo MD   ezetimibe (ZETIA) 10 mg tablet Take 10 mg by mouth nightly.   Yes Carmen Lugo MD   levothyroxine (SYNTHROID) 150 mcg tablet Take 150 mcg by mouth daily.   Yes Carmen Lugo MD   LORazepam (ATIVAN) 1 mg tablet Take 1 mg by mouth 2 (two) times a day.   Yes Carmen Lugo MD   metFORMIN (GLUCOPHAGE) 500 mg tablet Take 500 mg by mouth 2 (two) times a day with meals.   Yes Carmen Lugo MD   pantoprazole (PROTONIX) 40 mg EC tablet Take 40 mg by mouth nightly.   Yes Carmen Lugo MD   rosuvastatin (CRESTOR) 40 mg tablet Take 40 mg by mouth daily.   Yes Carmen Lugo MD   tamsulosin (FLOMAX) 0.4 mg capsule Take 0.4 mg by mouth nightly.   Yes Carmen Lugo MD   temazepam (RESTORIL) 15 mg capsule Take 15 mg by mouth nightly as needed for sleep.   Yes Carmen Lugo MD   colchicine (COLCRYS) 0.6 mg tablet 0.6 mg as needed.    ProviderCarmen MD       Allergies: Gluten    Social History:   Social History     Socioeconomic History    Marital status:      Spouse name: None    Number of children: None    Years of education: None    Highest education level: None   Tobacco Use    Smoking status: Never    Smokeless tobacco: Never   Substance and Sexual Activity    Alcohol use: Yes     Comment: socially       Family History: History reviewed. No pertinent family history.    Objective     Vital signs in last 24 hours:  Temp:  [36.5 °C (97.7 °F)] 36.5 °C (97.7 °F)  Heart Rate:  [65] 65  Resp:  [18] 18  BP: (160)/(85) 160/85    No intake or output data in the 24 hours ending 05/20/24 0827  Intake/Output this shift:  No intake/output data recorded.    Labs  Lab Results   Component Value Date    WBC 7.80 05/13/2024    HGB 14.1 05/13/2024    HCT 42.7 05/13/2024     05/13/2024    CHOL  151 04/24/2015    TRIG 113 04/24/2015    HDL 46 04/24/2015    ALT 19 05/13/2024    AST 23 05/13/2024     05/13/2024    K 4.3 05/13/2024     05/13/2024    CREATININE 1.0 05/13/2024    BUN 16 05/13/2024    CO2 27 05/13/2024    MG 2.1 05/02/2015    TSH 15.07 (H) 05/17/2024    INR 1.1 05/13/2024    HGBA1C 6.2 (H) 05/13/2024    PT 13.8 05/13/2024    PTT 28 05/13/2024         Cardiac Imaging    TRANSTHORACIC ECHO (TTE) COMPLETE 05/13/2024    Interpretation Summary  The left ventricular cavity size is normal with mildly increased wall thickness and preserved systolic function. Estimated EF 60-65%. No regional wall motion abnormalities. Septal motion consistent with a postoperative state. Indeterminate diastolic function.    The aortic valve is tricuspid. Severe aortic stenosis with a peak velocity of 4.3 m/s and a mean gradient of 41 mmHg measured from the apex. Calculated aortic valve area of 0.9 cm2. Trace regurgitation.    The visualized portions of the aortic root and ascending aorta are normal in size.    The mitral valve is mildly thickened. Mild mitral annular calcification. No stenosis. Mild regurgitation.    The left atrium is normal in size.    The right ventricular cavity is normal with preserved systolic function.    The pulmonic valve is not well seen. There is trace pulmonic regurgitation.    The tricuspid valve is normal in appearance. Trace regurgitation with insufficient regurgitant jet to estimate RVSP.    The right atrium is normal in size.    The IVC is normal in size and collapses > 50% with inspiration consistent with normal right atrial pressures.    Trace pericardial effusion.    No prior study for comparison.        Full Code    Head/Ear/Nose/Throat:     Normo-cephalic. Bruising on chin from dental extraction.                               Eyes:     EOMI and PERRL.                     Respiratory:     diminished at the bases b/l.               Cardiovascular:     SB.               Gastrointestinal:     + Bowel sounds and non-tender.                 Genitourinary:     no-deformities.                    Extremities:     trace edema b/l lower extremities.            Musculoskeletal:      No injury or deformity.                   Neurological:     follows commands.       Behavior/Emotional:     appropriate and cooperative.                           Lymph:      No adenopathy noted.                               Skin:      Clean, dry and intact.     Examination of the patient performed at the bedside. Rounded with ICU team and discussed management/plan with surgical staff. Medications, radiological studies and labs reviewed and discussed with attending of record, Dr. Joe Campos.    Cardiothoracic Assessment and Plan:    Neurologic: A&Ox3 and pain controlled.     Cardiovascular: Severe AS prior to OR s/p transfemoral TAVR, also with CAD and essential hypertension. Off vasoactive medications and meeting BP goals. Will continue to optimize cardiac medications. Continue ASA.      Respiratory: I personally reviewed the most recent CXR which portrayed mild pulmonary congestion. Will encourage IS, mobilization and wean O2 PRN.      Genitourinary: No santos. Making adequate urine output. Will avoid nephrotoxic medications. Continue BPH therapy.     Endocrine: Follow FSBG. Hypothyroidism and DM management. Appreciate Endocrinology input.     Hematologic: No evidence active bleeding. DVT ppx per surgeon.     Infectious Disease: No indication for antibiotics at this time.     Fluids, Electrolytes: Electrolytes replaced per protocol.     Gastrointestinal: PO as tolerated. GI ppx.     Skin: OOB, PT when able.      Tubes, lines and drains: Will remove superfluous invasive monitors PRN.    Disposition: Guarded. Continue close monitoring on stepdown unit.           Jed Delcid, DO  5/20/2024

## 2024-05-20 NOTE — Clinical Note
Closure device placed for the right femoral artery. Closure device used: MANTA. Hemostasis achieved.

## 2024-05-20 NOTE — Clinical Note
Closure device placed for the left femoral artery. Closure device used: Angio-Seal. Hemostasis achieved.

## 2024-05-20 NOTE — Clinical Note
FR4 catheter advanced over the wire to the ascending aorta for aortic valve crossing, guidewire exchanged for the straight stiff glidewire which is used to cross into the LV, the straight stiff glidewire is removed intact and the Safari wire is advanced into the LV through the FR4 catheter.

## 2024-05-20 NOTE — CONSULTS
Elsie Bowden D.O.  Endocrinology/Diabetes/Lipid Disorders Endocrinology Initial Consultation       CHIEF COMPLAINT   Habitus disease patient in consultation by Dr. Joe Campos for diabetes in the presence of aortic stenosis which has required surgery.     HISTORY OF PRESENT ILLNESS      Daljit Girard Jr. is a 84 y.o. male with a past medical history of non-insulin-requiring type 2 diabetes, hypertension, aortic stenosis, hyperlipidemia, hypothyroidism and obesity who presented for elective aortic valve surgery.    This is a patient who has had known aortic stenosis and has been watched via echocardiograms.  His symptoms were that of worsening shortness of breath with activities.  He denied chest pain or palpitations lightheadedness or dizziness.    Echocardiogram on May 13 showed a tricuspid aortic valve with a peak velocity of 4.3 m/s, mean gradient of 41 mmHg and aortic valve area of 0.9 cm².  He sought consultation in the outpatient offices of Dr. Campos who offered him a transfemoral TAVR which was successfully performed today.    He has a longstanding history of diabetes and is currently only on metformin 500 mg twice daily.  This patient also has a history of hypothyroidism and has been on 150 mcg of thyroxine for several years.  He is unaware of his most recent abnormal TSH values.  PAST MEDICAL AND SURGICAL HISTORY      Past Medical History:   Diagnosis Date    Coronary artery disease     Hypertension     Lipid disorder     Sleep apnea     cpap    Type 2 diabetes mellitus (CMS/[facility])        Past Surgical History:   Procedure Laterality Date    COLONOSCOPY      CORONARY ARTERY BYPASS GRAFT      x 5 stents    ROTATOR CUFF REPAIR Bilateral        MEDICATIONS      Medications Prior to Admission   Medication Sig Dispense Refill Last Dose    amoxicillin (AMOXIL) 500 mg capsule Take 500 mg by mouth 4 (four) times a day.   5/19/2024    aspirin 81 mg enteric coated tablet Take 81 mg by mouth  nightly.   5/19/2024    clopidogreL (PLAVIX) 75 mg tablet Take 75 mg by mouth daily.   5/19/2024    DULoxetine (CYMBALTA) 60 mg capsule Take 60 mg by mouth nightly.   5/19/2024    ezetimibe (ZETIA) 10 mg tablet Take 10 mg by mouth nightly.   5/19/2024    levothyroxine (SYNTHROID) 150 mcg tablet Take 150 mcg by mouth daily.   5/20/2024    LORazepam (ATIVAN) 1 mg tablet Take 1 mg by mouth 2 (two) times a day.   5/19/2024    metFORMIN (GLUCOPHAGE) 500 mg tablet Take 500 mg by mouth 2 (two) times a day with meals.   5/19/2024    pantoprazole (PROTONIX) 40 mg EC tablet Take 40 mg by mouth nightly.   5/19/2024    rosuvastatin (CRESTOR) 40 mg tablet Take 40 mg by mouth daily.   5/19/2024    tamsulosin (FLOMAX) 0.4 mg capsule Take 0.4 mg by mouth nightly.   5/19/2024    temazepam (RESTORIL) 15 mg capsule Take 15 mg by mouth nightly as needed for sleep.   5/19/2024    colchicine (COLCRYS) 0.6 mg tablet 0.6 mg as needed.          Current Facility-Administered Medications   Medication Dose Route Frequency Provider Last Rate Last Admin    acetaminophen (TYLENOL) tablet 975 mg  975 mg oral q6h Ricky Conway PA C   975 mg at 05/20/24 1320    alum-mag hydroxide-simeth (MAALOX) 200-200-20 mg/5 mL suspension 30 mL  30 mL oral q4h PRN Ricky Conway PA C        aspirin enteric coated tablet 81 mg  81 mg oral Daily Ricky Conway PA C        [START ON 5/23/2024] bisacodyL (DULCOLAX) 10 mg suppository 10 mg  10 mg rectal PRN Ricky Conway PA C        calcium chloride 1 g in sodium chloride 0.9 % 50 mL IVPB  1 g intravenous q6h PRN Ricky Conway PA C        ceFAZolin (ANCEF) 2 g/100 mL NSS  2 g intravenous q8h INT Ricky Conway PA C        chlorhexidine (PERIDEX) 0.12 % mouthwash 15 mL  15 mL Mouth/Throat 2 times per day Ricky Conway PA C        [START ON 5/21/2024] clopidogreL (PLAVIX) tablet 75 mg  75 mg oral Daily Ricky Conway PA C        docusate sodium (COLACE) capsule 100 mg  100 mg oral BID  Ricky Conway PA C        DULoxetine (CYMBALTA) capsule,delayed release(DR/EC) 60 mg  60 mg oral Nightly ZoraidaRicky alaniz PA C        electrolyte-A (PLASMA-LYTE A) infusion  125 mL/hr intravenous Continuous Naeem Logan MD        ezetimibe (ZETIA) tablet 10 mg  10 mg oral Nightly ZoraidaRicky alaniz PA C        famotidine (PEPCID) injection 20 mg  20 mg intravenous BID Ricky Conway PA C        Or    famotidine (PEPCID) tablet 20 mg  20 mg oral BID Ricky Conway PA C        furosemide (LASIX) injection 40 mg  40 mg intravenous Daily Ricky Conway PA C        And    potassium chloride (KLOR-CON M) ER tablet (particles/crystals) 20 mEq  20 mEq oral Daily Ricky Conway PA C        hydrALAZINE (APRESOLINE) injection 10 mg  10 mg intravenous Once Daljit Tolentino CRNP        [START ON 5/21/2024] levothyroxine (SYNTHROID) tablet 150 mcg  150 mcg oral Daily (6:30a) Ricky Conway PA C        magnesium oxide (MAG-OX) tablet 400 mg  400 mg oral BID Ricky Conway PA C        magnesium sulfate IVPB 2g in 50 mL NSS/D5W/SWFI  2 g intravenous PRN Ricky Conway PA C   Stopped at 05/20/24 1348    niCARdipine (CARDENE) 50 mg in sodium chloride 0.9 % 100 mL (0.5 mg/mL) infusion  2.5 mg/hr intravenous Titrated Naeem Logan MD        ondansetron ODT (ZOFRAN-ODT) disintegrating tablet 4 mg  4 mg oral q8h PRN Ricky Conway PA C        Or    ondansetron (ZOFRAN) injection 4 mg  4 mg intravenous q8h PRN Ricky Conway PA C        polyethylene glycol (MIRALAX) 17 gram packet 17 g  17 g oral Daily Ricky Conway PA C        potassium chloride 20 mEq in 100 mL IVPB  (premix)  20 mEq intravenous q8h PRN Ricky Conway PA C        rosuvastatin (CRESTOR) tablet 40 mg  40 mg oral Daily Ricky Conway PA C        senna (SENOKOT) tablet 1 tablet  1 tablet oral BID Ricky Conway PA C        tamsulosin (FLOMAX) 24 hr ER capsule 0.4 mg  0.4 mg oral Nightly Ricky Conway PA  C           ALLERGIES      Gluten    FAMILY HISTORY      History reviewed. No pertinent family history.    SOCIAL HISTORY      Social History     Socioeconomic History    Marital status:      Spouse name: None    Number of children: None    Years of education: None    Highest education level: None   Tobacco Use    Smoking status: Never    Smokeless tobacco: Never   Substance and Sexual Activity    Alcohol use: Yes     Comment: socially       REVIEW OF SYSTEMS      All other systems reviewed and negative except as noted in HPI    PHYSICAL EXAMINATION      Temp:  [36.5 °C (97.7 °F)-37.1 °C (98.7 °F)] 36.7 °C (98.1 °F)  Heart Rate:  [50-65] 57  Resp:  [8-21] 18  BP: (120-174)/(55-85) 174/80  Body mass index is 32.47 kg/m².      Physical Exam:  General: non toxic, alert  Head: NC/AT  Eyes: EOMI, Sclera anicteric  Mouth: dry mucous membranes  Neck: supple, no palpable thyromegaly, no palpable thyroid nodules or adenopathy,no carotid bruits  Cardiovascular: regular + S1/S2.  No murmurs heard.  Lungs: clear to auscultation  Abdomen: soft, NT/ND, + bowel sounds.  Obese.  Extremities: no clubbing, cyanosis, or edema.  Positive palpable DP pulses  Neurology: no focal deficits, moves all 4 extremities  Psych: cooperative with exam, pleasant,non confrontational  Skin: no rashes.  Ja complexion of his face.    LABS / IMAGING / EKG        Labs  Lab Results   Component Value Date    HGBA1C 6.2 (H) 05/13/2024     Lab Results   Component Value Date    WBC 6.22 05/20/2024    HGB 12.7 (L) 05/20/2024    HCT 38.3 (L) 05/20/2024     05/20/2024    CHOL 151 04/24/2015    TRIG 113 04/24/2015    HDL 46 04/24/2015    ALT 19 05/13/2024    AST 23 05/13/2024     05/20/2024    K 4.4 05/20/2024     05/20/2024    CREATININE 0.9 05/20/2024    BUN 18 05/20/2024    CO2 26 05/20/2024    TSH 15.07 (H) 05/17/2024    FREET4 0.91 05/17/2024       Lab Results   Component Value Date    TSH 15.07 (H) 05/17/2024     Lab Results  "  Component Value Date    CHOL 151 04/24/2015     Lab Results   Component Value Date    HDL 46 04/24/2015     Lab Results   Component Value Date    LDLCALC 82 04/24/2015     Lab Results   Component Value Date    TRIG 113 04/24/2015     No results found for: \"CHOLHDL\"    All point-of-care blood sugars reviewed by me.                Type 2 Diabetes-appears to be controlled in the outpatient setting on monotherapy with metformin which she will continue upon discharge.  Here, we will give him basal bolus insulin and check his blood sugars before meals and at the hour of sleep.  I informed the patient to summon his nurse when his meal tray arrives to his room so that he could obtain insulin.   Hyperlipidemia-lipid profile reviewed by me.  He does have a history of CAD and is status post CABG in 2014.  Given this history, he needs an LDL cholesterol less than 55 mg/dL.  To this end, I would recommend that he be considered for additional therapy in addition to his rosuvastatin 40 and Zetia 10 mg daily.  Also dietary recommendations were given by this examiner.   Hypertension-under control at the present time on no vasoactive medications.   Coronary Artery Disease-history of CABG in the past.  No new symptoms at this time.  Bypass grafts were patent at the time of his recent cardiac catheterization.   Aortic Stenosis-symptomatic and severe.  Status postop day #0 of transfemoral TAVR doing well.   Hypothyroidism-thyroid function test reviewed by me.  I am aware of his current TSH and he had a TSH of 24.38 M IU/L on May 13 with no change in his thyroid hormone dosing.  I would recommend upon discharge that his thyroid hormone be changed to 150 mcg 1 tablet 6 days a week and a tablet and a half 1 day a week.  His discharge instructions should reflect the need for him to obtain repeat thyroid function test in approximately 12 weeks.  Obesity with a BMI of 32-recommendations given by this examiner for dietary restrictions which " avoid all concentrated sweets, limit simple carbohydrates and decrease portions in general.       Elsie Bowden DO  5/20/2024  1:57 PM

## 2024-05-20 NOTE — Clinical Note
Pigtail catheter advanced to the ascending aorta over a guidewire for angiography assessment to assist with TAVR valve replacement.

## 2024-05-20 NOTE — ANESTHESIA POSTPROCEDURE EVALUATION
Patient: Daljit Girard Jr.    Procedure Summary       Date: 05/20/24 Room / Location: [facility] OR [facility] / [facility]    Anesthesia Start: 0918 Anesthesia Stop: 1047    Procedures:       TAVR TRANSFEMORAL AORTIC VALVE REPLACEMENT      TAVR percutaneous femoral Diagnosis:       Aortic valve stenosis, etiology of cardiac valve disease unspecified      Encounter for examination for normal comparison and control in clinical research program      (aortic stenosis)    Surgeons: Joe Campos MD; Win Suh DO Responsible Provider: Naeem Logan MD    Anesthesia Type: MAC ASA Status: 4            Anesthesia Type: MAC  PACU Vitals    No data found in the last 10 encounters.           Anesthesia Post Evaluation    Pain management: adequate  Patient location during evaluation: PACU  Patient participation: complete - patient participated  Level of consciousness: awake and alert  Cardiovascular status: acceptable  Airway Patency: adequate  Respiratory status: acceptable  Hydration status: acceptable  Anesthetic complications: no

## 2024-05-20 NOTE — NURSING NOTE
Small soft hematoma at site of R radial a-line removal. Dr. Naeem Logan aware. +2 radial pulse and pt denies numbness/tingling.

## 2024-05-20 NOTE — OR SURGEON
Pre-Procedure patient identification:  I am the primary operating surgeon/proceduralist and I have reviewed the applicable pathology reports and radiology studies for this procedure. I have identified the patient on 05/20/24 at 9:02 AM Joe Campos MD  Phone Number: [phone]

## 2024-05-20 NOTE — ANESTHESIA PREPROCEDURE EVALUATION
Relevant Problems   CARDIOVASCULAR   (+) Aortic valve stenosis       ROS/Med Hx     Past Surgical History:   Procedure Laterality Date    COLONOSCOPY      CORONARY ARTERY BYPASS GRAFT      x 5 stents    ROTATOR CUFF REPAIR Bilateral        Physical Exam    Airway   Mallampati: III   TM distance: >3 FB   Neck ROM: full        Anesthesia Plan    Plan: MAC       4 ASA    84M for TAVR  CAD s/p CABG  DM A1C 6.2  Carotid stenosis  HTN    Carotids: LICA 50-69%  TTE: EF 60%, severe AS, mild MR, normal RV,

## 2024-05-20 NOTE — Clinical Note
right femoral artery in preparation for closure of site at end of case. Clindamycin Counseling: I counseled the patient regarding use of clindamycin as an antibiotic for prophylactic and/or therapeutic purposes. Clindamycin is active against numerous classes of bacteria, including skin bacteria. Side effects may include nausea, diarrhea, gastrointestinal upset, rash, hives, yeast infections, and in rare cases, colitis.

## 2024-05-21 ENCOUNTER — APPOINTMENT (INPATIENT)
Dept: CARDIOLOGY | Facility: HOSPITAL | Age: 84
DRG: 267 | End: 2024-05-21
Attending: PHYSICIAN ASSISTANT
Payer: MEDICARE

## 2024-05-21 ENCOUNTER — APPOINTMENT (INPATIENT)
Dept: RADIOLOGY | Facility: HOSPITAL | Age: 84
DRG: 267 | End: 2024-05-21
Attending: PHYSICIAN ASSISTANT
Payer: MEDICARE

## 2024-05-21 VITALS
HEART RATE: 69 BPM | HEIGHT: 69 IN | OXYGEN SATURATION: 98 % | BODY MASS INDEX: 32.61 KG/M2 | TEMPERATURE: 98.5 F | DIASTOLIC BLOOD PRESSURE: 70 MMHG | RESPIRATION RATE: 16 BRPM | WEIGHT: 220.2 LBS | SYSTOLIC BLOOD PRESSURE: 150 MMHG

## 2024-05-21 LAB
ANION GAP SERPL CALC-SCNC: 7 MEQ/L (ref 3–15)
AORTIC VALVE MEAN VELOCITY: 1.84 M/S
AORTIC VALVE VELOCITY TIME INTEGRAL: 46.5 CM
ATRIAL RATE: 55
ATRIAL RATE: 56
AV MEAN GRADIENT: 15 MMHG
AV PEAK GRADIENT: 25 MMHG
AV PEAK VELOCITY-S: 2.48 M/S
AV VALVE AREA INDEX: 0.81
AV VALVE AREA: 1.38 CM2
AV VELOCITY RATIO: 0.43
AVA (VTI): 1.79 CM2
BSA FOR ECHO PROCEDURE: 2.21 M2
BUN SERPL-MCNC: 21 MG/DL (ref 7–25)
CA-I BLD-SCNC: 1.14 MMOL/L (ref 1.15–1.27)
CALCIUM SERPL-MCNC: 9.1 MG/DL (ref 8.6–10.3)
CHLORIDE SERPL-SCNC: 105 MEQ/L (ref 98–107)
CO2 SERPL-SCNC: 24 MEQ/L (ref 21–31)
CREAT SERPL-MCNC: 0.9 MG/DL (ref 0.7–1.3)
DOP CALC LVOT STROKE VOLUME: 83.05 CM3
EDV (BP): 174 CM3
EF (A4C): 60.6 %
EF A2C: 59.3 %
EGFRCR SERPLBLD CKD-EPI 2021: >60 ML/MIN/1.73M*2
EJECTION FRACTION: 59.7 %
ERYTHROCYTE [DISTWIDTH] IN BLOOD BY AUTOMATED COUNT: 14.2 % (ref 11.6–14.4)
ESV (BP): 70.2 CM3
GLUCOSE BLD-MCNC: 108 MG/DL (ref 70–99)
GLUCOSE BLD-MCNC: 126 MG/DL (ref 70–99)
GLUCOSE SERPL-MCNC: 127 MG/DL (ref 70–99)
HCT VFR BLD AUTO: 40.3 % (ref 40.1–51)
HGB BLD-MCNC: 13.4 G/DL (ref 13.7–17.5)
LEFT VENTRICLE DIASTOLIC VOLUME INDEX: 76.92 CM3/M2
LEFT VENTRICLE DIASTOLIC VOLUME: 170 CM3
LEFT VENTRICLE SYSTOLIC VOLUME INDEX: 30.27 CM3/M2
LEFT VENTRICLE SYSTOLIC VOLUME: 66.9 CM3
LV DIASTOLIC VOLUME: 166 CM3
LV ESV (APICAL 2 CHAMBER): 67.5 CM3
LVAD-AP2: 42.5 CM2
LVAD-AP4: 44.3 CM2
LVAS-AP2: 23.3 CM2
LVAS-AP4: 25.7 CM2
LVEDVI(A2C): 75.11 CM3/M2
LVEDVI(BP): 78.73 CM3/M2
LVESVI(A2C): 30.54 CM3/M2
LVESVI(BP): 31.76 CM3/M2
LVLD-AP2: 8.85 CM
LVLD-AP4: 9.51 CM
LVLS-AP2: 7.25 CM
LVLS-AP4: 8.12 CM
LVOT 2D: 2.3 CM
LVOT A: 4.15 CM2
LVOT MG: 2 MMHG
LVOT MV: 0.73 M/S
LVOT PEAK VELOCITY: 1.05 M/S
LVOT PG: 4 MMHG
LVOT STROKE VOLUME INDEX: 37.58 ML/M2
LVOT VTI: 20 CM
MAGNESIUM SERPL-MCNC: 2.2 MG/DL (ref 1.8–2.5)
MCH RBC QN AUTO: 30.2 PG (ref 28–33.2)
MCHC RBC AUTO-ENTMCNC: 33.3 G/DL (ref 32.2–36.5)
MCV RBC AUTO: 91 FL (ref 83–98)
MLH CV ECHO AVA INDEX VELOCITY RATIO: 0.6
P AXIS: 15
P AXIS: 55
PDW BLD AUTO: 11 FL (ref 9.4–12.4)
PHOSPHATE SERPL-MCNC: 4.3 MG/DL (ref 2.4–4.7)
PLATELET # BLD AUTO: 233 K/UL (ref 150–350)
POCT TEST: ABNORMAL
POCT TEST: ABNORMAL
POTASSIUM SERPL-SCNC: 4.2 MEQ/L (ref 3.5–5.1)
PR INTERVAL: 160
PR INTERVAL: 162
QRS DURATION: 76
QRS DURATION: 98
QT INTERVAL: 454
QT INTERVAL: 460
QTC CALCULATION(BAZETT): 434
QTC CALCULATION(BAZETT): 443
R AXIS: -12
R AXIS: -15
RBC # BLD AUTO: 4.43 M/UL (ref 4.5–5.8)
SODIUM SERPL-SCNC: 136 MEQ/L (ref 136–145)
T WAVE AXIS: -68
T WAVE AXIS: -86
VENTRICULAR RATE: 55
VENTRICULAR RATE: 56
WBC # BLD AUTO: 8.61 K/UL (ref 3.8–10.5)

## 2024-05-21 PROCEDURE — 84100 ASSAY OF PHOSPHORUS: CPT | Performed by: PHYSICIAN ASSISTANT

## 2024-05-21 PROCEDURE — 93010 ELECTROCARDIOGRAM REPORT: CPT | Mod: 76 | Performed by: INTERNAL MEDICINE

## 2024-05-21 PROCEDURE — 80048 BASIC METABOLIC PNL TOTAL CA: CPT | Performed by: PHYSICIAN ASSISTANT

## 2024-05-21 PROCEDURE — 93005 ELECTROCARDIOGRAM TRACING: CPT | Performed by: PHYSICIAN ASSISTANT

## 2024-05-21 PROCEDURE — 93306 TTE W/DOPPLER COMPLETE: CPT | Mod: 26 | Performed by: STUDENT IN AN ORGANIZED HEALTH CARE EDUCATION/TRAINING PROGRAM

## 2024-05-21 PROCEDURE — 63600000 HC DRUGS/DETAIL CODE: Mod: JZ | Performed by: PHYSICIAN ASSISTANT

## 2024-05-21 PROCEDURE — 82330 ASSAY OF CALCIUM: CPT | Performed by: PHYSICIAN ASSISTANT

## 2024-05-21 PROCEDURE — 36415 COLL VENOUS BLD VENIPUNCTURE: CPT | Performed by: PHYSICIAN ASSISTANT

## 2024-05-21 PROCEDURE — 63700000 HC SELF-ADMINISTRABLE DRUG

## 2024-05-21 PROCEDURE — 25800000 HC PHARMACY IV SOLUTIONS: Performed by: PHYSICIAN ASSISTANT

## 2024-05-21 PROCEDURE — 25000000 HC PHARMACY GENERAL: Performed by: PHYSICIAN ASSISTANT

## 2024-05-21 PROCEDURE — 63600000 HC DRUGS/DETAIL CODE: Performed by: INTERNAL MEDICINE

## 2024-05-21 PROCEDURE — 83735 ASSAY OF MAGNESIUM: CPT | Performed by: PHYSICIAN ASSISTANT

## 2024-05-21 PROCEDURE — 63700000 HC SELF-ADMINISTRABLE DRUG: Performed by: PHYSICIAN ASSISTANT

## 2024-05-21 PROCEDURE — 71045 X-RAY EXAM CHEST 1 VIEW: CPT

## 2024-05-21 PROCEDURE — 99231 SBSQ HOSP IP/OBS SF/LOW 25: CPT | Performed by: ANESTHESIOLOGY

## 2024-05-21 PROCEDURE — 99233 SBSQ HOSP IP/OBS HIGH 50: CPT | Performed by: THORACIC SURGERY (CARDIOTHORACIC VASCULAR SURGERY)

## 2024-05-21 PROCEDURE — 93306 TTE W/DOPPLER COMPLETE: CPT

## 2024-05-21 PROCEDURE — 85027 COMPLETE CBC AUTOMATED: CPT | Performed by: PHYSICIAN ASSISTANT

## 2024-05-21 RX ORDER — AMLODIPINE BESYLATE 5 MG/1
5 TABLET ORAL DAILY
Qty: 30 TABLET | Refills: 0 | Status: SHIPPED | OUTPATIENT
Start: 2024-05-22 | End: 2024-05-21

## 2024-05-21 RX ORDER — AMLODIPINE BESYLATE 5 MG/1
5 TABLET ORAL DAILY
Qty: 30 TABLET | Refills: 1 | Status: SHIPPED | OUTPATIENT
Start: 2024-05-22 | End: 2024-07-25

## 2024-05-21 RX ORDER — FAMOTIDINE 20 MG/1
20 TABLET, FILM COATED ORAL 2 TIMES DAILY
Qty: 60 TABLET | Refills: 0 | Status: CANCELLED | OUTPATIENT
Start: 2024-05-21 | End: 2024-06-20

## 2024-05-21 RX ORDER — METFORMIN HYDROCHLORIDE 500 MG/1
500 TABLET ORAL 2 TIMES DAILY WITH MEALS
Qty: 60 TABLET | Refills: 0 | Status: SHIPPED | OUTPATIENT
Start: 2024-05-23 | End: 2024-07-25

## 2024-05-21 RX ORDER — AMLODIPINE BESYLATE 5 MG/1
5 TABLET ORAL DAILY
Status: DISCONTINUED | OUTPATIENT
Start: 2024-05-21 | End: 2024-05-21 | Stop reason: HOSPADM

## 2024-05-21 RX ORDER — AMLODIPINE BESYLATE 10 MG/1
10 TABLET ORAL DAILY
Status: DISCONTINUED | OUTPATIENT
Start: 2024-05-21 | End: 2024-05-21

## 2024-05-21 RX ADMIN — ACETAMINOPHEN 975 MG: 325 TABLET ORAL at 13:13

## 2024-05-21 RX ADMIN — INSULIN LISPRO 3 UNITS: 100 INJECTION, SOLUTION INTRAVENOUS; SUBCUTANEOUS at 09:20

## 2024-05-21 RX ADMIN — POTASSIUM CHLORIDE 20 MEQ: 1500 TABLET, EXTENDED RELEASE ORAL at 08:41

## 2024-05-21 RX ADMIN — MAGNESIUM OXIDE TAB 400 MG (241.3 MG ELEMENTAL MG) 400 MG: 400 (241.3 MG) TAB at 08:38

## 2024-05-21 RX ADMIN — ACETAMINOPHEN 975 MG: 325 TABLET ORAL at 01:53

## 2024-05-21 RX ADMIN — AMLODIPINE BESYLATE 5 MG: 5 TABLET ORAL at 08:39

## 2024-05-21 RX ADMIN — CLOPIDOGREL 75 MG: 75 TABLET ORAL at 08:37

## 2024-05-21 RX ADMIN — LEVOTHYROXINE SODIUM 150 MCG: 0.07 TABLET ORAL at 06:25

## 2024-05-21 RX ADMIN — CEFAZOLIN 2 G: 2 INJECTION, POWDER, FOR SOLUTION INTRAMUSCULAR; INTRAVENOUS at 01:53

## 2024-05-21 RX ADMIN — ACETAMINOPHEN 975 MG: 325 TABLET ORAL at 08:34

## 2024-05-21 RX ADMIN — CALCIUM CHLORIDE 1 G: 100 INJECTION, SOLUTION INTRAVENOUS at 04:31

## 2024-05-21 RX ADMIN — ROSUVASTATIN CALCIUM 40 MG: 40 TABLET, FILM COATED ORAL at 08:40

## 2024-05-21 RX ADMIN — INSULIN LISPRO 3 UNITS: 100 INJECTION, SOLUTION INTRAVENOUS; SUBCUTANEOUS at 13:09

## 2024-05-21 RX ADMIN — FUROSEMIDE 40 MG: 10 INJECTION, SOLUTION INTRAMUSCULAR; INTRAVENOUS at 06:25

## 2024-05-21 RX ADMIN — ASPIRIN 81 MG: 81 TABLET, COATED ORAL at 08:36

## 2024-05-21 RX ADMIN — FAMOTIDINE 20 MG: 20 TABLET, FILM COATED ORAL at 08:38

## 2024-05-21 ASSESSMENT — COGNITIVE AND FUNCTIONAL STATUS - GENERAL
STANDING UP FROM CHAIR USING ARMS: 3 - A LITTLE
WALKING IN HOSPITAL ROOM: 3 - A LITTLE
CLIMB 3 TO 5 STEPS WITH RAILING: 3 - A LITTLE
MOVING TO AND FROM BED TO CHAIR: 3 - A LITTLE

## 2024-05-21 NOTE — DISCHARGE INSTRUCTIONS
Discharge Instructions [facility]/TAVR    You will need dental prophylaxis prior to any cleaning or procedure in the future.  Recommendations for 2 g of amoxicillin or 600 mg of clindamycin (if penicillin allergic) 1 hour prior to your appointment.  You had a valve repair/replacement surgery.    GUIDELINE FOR ACTIVITY     During your first week at home we strongly suggest that you have assistance from family and friends. You can expect to have good days and bad days, so regulate activities according to what your body tells you. Try to balance rest and activity, and when resting remember to keep your legs elevated.       It is important to continue to weigh yourself everyday, just as you were weighed daily in the hospital. Try to weigh yourself every morning after going to the bathroom, using the same scale. A gradual weight gain of 5 pounds in 3 to 5 days, or any increase in puffiness or swelling in fingers and ankles, should be reported to your doctor immediately.       It is also important to shower daily, in order to keep your incisions clean. You should check your incision daily, and report any increased redness, drainage, or a temperature over 101 to your surgeon immediately. A simple task of taking a shower can be very challenging the first week or two after surgery and you may find that you need some assistance. A chair in the shower can be helpful if standing for a long period is too tiring. A simple plastic lawn chair can be placed in the shower, or a small shower bench can be purchased at a local drug store. A handheld shower head could also be helpful to use while sitting in the shower. You may also find that you just need an extra set of hands to help reach your back or wash your hair.           If you have an incision on your chest you should avoid lifting anything more than 5 pounds for 6 weeks. People do not usually realize how much this may affect them. A gallon of milk weighs about 8 pounds.   Also please  avoid bending over greater than a 90 degree angle for 6 weeks post op.      RESPIRATORY/BREATHING    For the first week at home, continue to use the Incentive Spirometer that you were using in the hospital. Try to use it 4-5 times each day, doing 10 puffs each time. Should any unusual shortness of breath occur at any time, especially when lying flat, notify your doctor immediately.       DIET   For the first week at home, eat what you want! We just want to make sure that you eat! It may take a while for your appetite to come back, so maybe six small meals a day will be easier than three large meals. Also try to limit your fluid intake to 6-7 cups per day. Remember to weigh yourself daily and report any sudden weight gain. Avoid high salt content foods.       BOWELS   If you do not have a bowel movement within 2 days of being at home, try some prune juice or an over-the-counter laxative(Milk of Magnesia or Bisacodyl suppositories are the quick answers, Miralax is another option but take a couple of days to work). If there is no bowel movement in the first week, notify your doctor immediately.       MEDICATIONS    At discharge from the hospital, your nurse went over all the medications you are going to take at home. Remember only to take the medication listed on your discharge instructions. DO NOT try to incorporate previous medications into your daily instructed mediations. A home care nurse will help you set up a schedule for taking your medications usually during the 1st home visit. If you elected not to par take in home care, take your medications according to the schedule given to you by your discharging nurse.   You will be discharged on Aspirin and Plavix. You will take both of these for 6 months after your procedure. If for some reason you are not on plavix you may be on another blood thinning medication for another reason. This medication may continue longer then 6 months.       PAIN MANAGEMENT   Please try 2  extra strength Tylenol (acetaminophen) every 6- 8 hrs for incisional discomfort. You will also be sent home with prescription strength pain medication. Use these pain pills when you need them, and if the Tylenol is not helping. Suggestions for use include medicating- before activity, at bedtime, and then when needed. By the second week you should be able to use just Extra Strength Tylenol for pain. Remember, no driving while taking prescription pain meds!       FAMILY   For the first week at home you should try to limit your visitors. Entertaining even your closest friends and family may tire you out too much. Remember that it is okay to excuse yourself when you are too tired. During the second to third week you can start to increase your number of visitors as tolerated. By the fourth week, enjoy yourself!           It is also important to mention that sometimes following surgery people become depressed. Family and friends should be aware of signs of depression. It is important to watch for changes in appetite, changes in sleep patterns, not wanting to see friends/family, not wanting to participate in daily activities. Any sign of depression should be reported to the doctor.           FOLLOW UP APPOINTMENTS  You will be given an appointment to return to your surgeon’s office 1 month after surgery. This appointment will follow an Echocardiogram appointment at [facility] as well. So go get your echo on the street [facility] then come on up to the [facility] Level “E” for your doctor’s appointment.   You also should be calling your cardiologist for an appointment in 1-2 weeks after your discharge date. This is the doctor who will continue to manage your heart condition after your surgeon sees you in 1 month’s time.              WHEN TO CALL THE DOCTOR   As previously mentioned, call the doctor for:   ~ Temperature over 101   ~ Increased redness or drainage from incisions   ~ Unusual shortness of breath    ~ No bowel movement in the first week   ~ Gradual weight gain of 5 pounds in 3-5 days   ~ Increased puffiness or swelling in fingers or ankles   ~ Pain not relieved by pain pills       FOR ANY QUESTIONS OR CONCERNS,    CALL SURGEON'S OFFICE: [phone]

## 2024-05-21 NOTE — DISCHARGE SUMMARY
Cardiac Surgery Discharge Summary      Attending Provider: Joe Campos MD Attending phys phone: [phone]  Primary Care Physician at Discharge: Jay Ordoñez [phone]    Admission Date: 5/20/2024     Discharge Date: 5/21/2024    Operative Procedures Performed  Daljit Girard underwent transfemoral #29mm Crespo Rebecca S3 transcatheter aortic valve replacement by Dr. Joe Campos and the structural heart team on 5/20/24.        Presenting Problem/History of Present Illness  Mr. Daljit Girard, an 85yo male with PMH CAD s/p robotic CABG (2014) and PCI (2016), carotid stenosis, HLD, HTN, and aortic stenosis, who was seen in outpatient surgical consultation by Dr. Joe Campos for his severe valvular disease.  Mr. Girard notes worsening GHOTRA as his only symptom.      Hospital Course  Mr. Girard was admitted to [facility] on 5/20/24 undergoing transfemoral TAVR.  Patient received no blood products and did not require a TVP placement post operatively.  He recovered in PACU and was transitioned to the CT step down unit for monitoring overnight.  Patient had an uneventful night.  On POD 1 patient completed all postoperative imaging.  CXR was without pneumo, and TTE revealed no valvular regurgitation and trace paravalvular regurgitation.  Postoperatively the patient was hypertensive SBP 170s-180s, he was given IVP hydralazine x2 and was started on norvasc 5mg PO daily.  He responded well to medication with discharge SBP 130s-140s. He was ambulating without supplemental oxygen, hemodynamically stable, and in sinus rhythm.  It was at this time patient was determined to stable for discharge to home.  Patient to be discharged home.  Postop wound care and physical restrictions reviewed with the patient, patient expressed understanding.  Medication list reviewed with patient, all new prescriptions sent electronically to patient's pharmacy.  Patient is to follow up with structural heart  in two months related to scheduling issues.  Patient is to follow up with cardiologist  in 2 weeks and PCP Dr. Ordoñez in 1-3 weeks, calling to schedule appointments.  All questions answered at this time.  If patient has further concerns patient to call the office.       Primary Discharge Diagnosis  Coronary artery disease, unspecified vessel or lesion type, unspecified whether angina present, unspecified whether native or transplanted heart    Secondary Discharge Diagnosis  Active Hospital Problems    Diagnosis Date Noted    Coronary artery disease, unspecified vessel or lesion type, unspecified whether angina present, unspecified whether native or transplanted heart 05/20/2024    Aortic valve stenosis 05/13/2024    Encounter for examination for normal comparison and control in clinical research program 05/13/2024      Resolved Hospital Problems   No resolved problems to display.         Consult Orders During Admission:  IP CONSULT TO ENDOCRINOLOGY  IP CONSULT TO NUTRITION SERVICES  IP CONSULT TO CASE MANAGEMENT     Pertinent Test Results:   CBC Results         05/21/24 05/20/24 05/20/24     0207 1658 1056    WBC 8.61 9.67 6.22    RBC 4.43 4.67 4.18    HGB 13.4 14.0 12.7    HCT 40.3 41.7 38.3    MCV 91.0 89.3 91.6    MCH 30.2 30.0 30.4    MCHC 33.3 33.6 33.2     259 229          BMP Results         05/21/24 05/20/24 05/20/24     0207 1658 1056     139 139    K 4.2 3.8 4.4    Cl 105 104 107    CO2 24 23 26    Glucose 127 138 115    BUN 21 17 18    Creatinine 0.9 0.9 0.9    Calcium 9.1 9.5 8.7    Anion Gap 7 12 6    EGFR >60.0 >60.0 >60.0           Comment for EGFR at 0207 on 05/21/24: Calculation based on the Chronic Kidney Disease Epidemiology Collaboration (CKD-EPI) equation refit without adjustment for race.    Comment for EGFR at 1658 on 05/20/24: Calculation based on the Chronic Kidney Disease Epidemiology Collaboration (CKD-EPI) equation refit without adjustment for race.    Comment for EGFR  at 1056 on 05/20/24: Calculation based on the Chronic Kidney Disease Epidemiology Collaboration (CKD-EPI) equation refit without adjustment for race.          Lab Results   Component Value Date    PTT 31 05/20/2024    INR 1.2 05/20/2024    PT 15.4 (H) 05/20/2024         Imaging  X-RAY CHEST 1 VIEW    Result Date: 5/21/2024  IMPRESSION: No active disease is seen in the chest..    IR TECHNICAL ASSISTANCE    Result Date: 5/21/2024  IMPRESSION: IR Technical assistance was provided for fluoroscopy in the HYBRID OR.  Fluoro time is 11.6 minutes. Dose is 334 mGy.    X-RAY CHEST 1 VIEW    Result Date: 5/20/2024  IMPRESSION: Cardiomegaly. Prominence of the pulmonary vasculature and interstitium.     Ultrasound Carotid bilateral    Result Date: 5/17/2024  IMPRESSION: 1. Right:  Velocities correlating to less than 50% stenosis. 2. Left:  Velocities correlating to 50-69% stenosis. 3. Bilateral antegrade flow within the vertebral arteries.     CTA TAVR WITH AND WITHOUT IV CONTRAST    Result Date: 5/14/2024  IMPRESSION: 1. CTA TAVR assessments and measurements as described above. 2. Non-cardiac portions of the exam were within normal limits for patient age. Non-cardiac CTA interpretation provided by Antonio Cristina M.D.          Exam on Day of Discharge  Patient seen and examined on day of discharge.  General Appearance:    Alert, cooperative, no distress, appears stated age   Head:    Normocephalic, without obvious abnormality, atraumatic   Eyes:    Conjunctiva/corneas clear   Nose:   Nares normal, septum midline, mucosa normal   Throat:   Lips, mucosa, and tongue normal; teeth and gums normal   Neck:   Supple, symmetrical, trachea midline, no carotid    bruit or JVD   Lungs:     Clear to auscultation bilaterally, respirations unlabored   Chest Wall:    No tenderness or deformity, incision site clean without erythema or drainage.     Heart:    Regular rate and rhythm, S1 and S2 normal, no murmur, rub or     gallop   Abdomen:      Soft, non-tender, bowel sounds active all four quadrants,     no masses, no organomegaly   Extremities:   Extremities normal, atraumatic, no cyanosis.      Musculoskeletal:  Pulses:   No injury or deformity    Symmetric all extremities   Skin:   Skin color, texture, turgor normal, no rashes or lesions   Neurologic:    Behavior/  Emotional:   Normal strength, sensation and reflexes throughout and symmetric.  Cranial nerves grossly intact.      Appropriate, cooperative       Discharge Orders  Discharge Orders Needing Review       Order Details Provider Order Origin    amoxicillin (AMOXIL) 500 mg capsule Take 500 mg by mouth 4 (four) times a day. Carmen Lugo MD Prior to Admission    aspirin 81 mg enteric coated tablet Take 81 mg by mouth nightly. Carmen Lugo MD Prior to Admission    clopidogreL (PLAVIX) 75 mg tablet Take 75 mg by mouth daily. Carmen Lugo MD Prior to Admission    DULoxetine (CYMBALTA) 60 mg capsule Take 60 mg by mouth nightly. Carmen Lugo MD Prior to Admission    ezetimibe (ZETIA) 10 mg tablet Take 10 mg by mouth nightly. Carmen Lugo MD Prior to Admission    levothyroxine (SYNTHROID) 150 mcg tablet Take 150 mcg by mouth daily. Carmen Lugo MD Prior to Admission    LORazepam (ATIVAN) 1 mg tablet Take 1 mg by mouth 2 (two) times a day. Carmen Lugo MD Prior to Admission    metFORMIN (GLUCOPHAGE) 500 mg tablet Take 500 mg by mouth 2 (two) times a day with meals. Carmen Lugo MD Prior to Admission    pantoprazole (PROTONIX) 40 mg EC tablet Take 40 mg by mouth nightly. Carmen Lugo MD Prior to Admission    rosuvastatin (CRESTOR) 40 mg tablet Take 40 mg by mouth daily. Carmen Lugo MD Prior to Admission    tamsulosin (FLOMAX) 0.4 mg capsule Take 0.4 mg by mouth nightly. Carmen Lugo MD Prior to Admission    temazepam (RESTORIL) 15 mg capsule Take 15 mg by mouth  nightly as needed for sleep. Provider, Carmen Ornelas MD Prior to Admission    acetaminophen (TYLENOL) tablet 975 mg 975 mg, oral, Every 6 hours, First dose on Mon 5/20/24 at 1400, Recovery (CV) and FloorMax acetaminophen 4000 mg/day Ricky Conway PA C Inpatient    alum-mag hydroxide-simeth (MAALOX) 200-200-20 mg/5 mL suspension 30 mL 30 mL, oral, Every 4 hours PRN, indigestion, Starting on Mon 5/20/24 at 1304, For 90 days, Recovery (CV) and Floor** Shake well before administration ** Ricky Conway PA C Inpatient    amLODIPine (NORVASC) tablet 5 mg 5 mg, oral, Daily, First dose (after last modification) on Tue 5/21/24 at 0900 Daljit Tolentino CRNP Inpatient    aspirin enteric coated tablet 81 mg 81 mg, oral, Daily, First dose on Mon 5/20/24 at 1400, Recovery (CV) and FloorHold for platelet count < 50,000 ** Do not crush, chew, or zuleta ** Ricky Conway PA C Inpatient    bisacodyL (DULCOLAX) 10 mg suppository 10 mg 10 mg, rectal, As needed, constipation, x 1 dose on POD# 3 if patient has not moved bowels, Starting on Thu 5/23/24 at 0000, For 1 dose, Recovery (CV) and Floor Ricky Conway PA C Inpatient    calcium chloride 1 g in sodium chloride 0.9 % 50 mL IVPB 1 g, intravenous, at 100 mL/hr, Administer over 30 Minutes, Every 6 hours PRN, ionized Ca less than 1.15 mmol/L, Starting on Mon 5/20/24 at 1036, Recovery (CV) and Floor** CENTRAL LINE PREFERRED ** AVOID MIDLINE ** Ricky Conway PA C Inpatient    clopidogreL (PLAVIX) tablet 75 mg 75 mg, oral, Daily, First dose on Tue 5/21/24 at 0900, Recovery (CV) and FloorHold for platelet count less than 100,000.  Start as soon as taking orals. Ricky Conway PA C Inpatient    colchicine (COLCRYS) 0.6 mg tablet 0.6 mg as needed. Provider, Carmen Ornelas MD Prior to Admission    docusate sodium (COLACE) capsule 100 mg 100 mg, oral, 2 times daily, First dose on Mon 5/20/24 at 2000, Recovery (CV) and FloorHold for loose stools or diarrhea. Zoraida,  MAGGIE Ewing Inpatient    DULoxetine (CYMBALTA) capsule,delayed release(DR/EC) 60 mg 60 mg, oral, Nightly, First dose on Mon 5/20/24 at 2200, Recovery (CV) and FloorSwallow capsule whole, do not crush or chew. May add contents of capsule to apple juice or applesauce and administer by mouth. Ricky Conway PA C Inpatient    electrolyte-A (PLASMA-LYTE A) infusion 125 mL/hr, intravenous, Continuous, Starting on Mon 5/20/24 at 1130, For 7 days, PACU (only)Until tolerating oral Naeem Logan MD Inpatient    ezetimibe (ZETIA) tablet 10 mg 10 mg, oral, Nightly, First dose on Mon 5/20/24 at 2200, Recovery (CV) and Floor ZoraidaRicky alaniz PA C Inpatient    famotidine (PEPCID) injection 20 mg 20 mg, intravenous, 2 times daily, First dose on Mon 5/20/24 at 2000, Recovery (CV) and FloorIf unable to take orally. Dilute vial with 3 mL of NSS to make final concentration of 20 mg/5 mL - Slow IV push over 2 mins.  If giving a dose less than 20 mg, dilute first then draw up the appropriate dose.Indications: prevention of stress ulcer Ricky Conway PA C Inpatient    famotidine (PEPCID) tablet 20 mg 20 mg, oral, 2 times daily, First dose on Mon 5/20/24 at 2000, Recovery (CV) and FloorIndications: prevention of stress ulcer Ricky Conway PA C Inpatient    insulin lispro U-100 (HumaLOG) pen 3 Units 3 Units, subcutaneous, 3 times daily with meals, First dose on Mon 5/20/24 at 1700Prandial Insulin.  Food tray present when injection is given.  Hold if NPO.  Allow 4 hours between injections of rapid acting insulins. Verify patient name on pen. Elsie Bowden DO Inpatient    insulin lispro U-100 (HumaLOG) pen 3-5 Units 3-5 Units, subcutaneous, 4 times daily with meals and nightly, First dose on Mon 5/20/24 at 2200Give in addition to scheduled dose Dose = 3 units for -240 Dose = 5 units for -280 Notify Physician if: BG is </= 70 or >/= 281 mg/dL. Do not hold if NPO.  Allow 4 hours between injections of  rapid acting insulins. Verify patient name on pen. Elsie Bowden,  Inpatient    levothyroxine (SYNTHROID) tablet 150 mcg 150 mcg, oral, Daily (6:30a), First dose on Tue 5/21/24 at 0630, Recovery (CV) and Floor Take on empty stomach; 30 minutes before food. Do not administer within 4 hours of calcium, iron products, or bile acid sequestrants   For administration via Tube Feeding: Hold tube feedings for 1 hour before and 1 hour after oral levothyroxine dose. If residuals are a problem consider changing oral levothyroxine to IV Ricky Conway PA C Inpatient    magnesium oxide (MAG-OX) tablet 400 mg 400 mg, oral, 2 times daily, First dose on Mon 5/20/24 at 2000, For 90 days, Recovery (CV) and FloorHold if Magnesium level >= 2.8 mg/dL and notify physician Ricky Conway PA C Inpatient    magnesium sulfate IVPB 2g in 50 mL NSS/[id number] 2 g, intravenous, at 25 mL/hr, Administer over 120 Minutes, As needed, if magnesium level < 2.2 mg/dL, Starting on Mon 5/20/24 at 1036, Recovery (CV) and Floor Ricky Conway PA C Inpatient    ondansetron (ZOFRAN) injection 4 mg 4 mg, intravenous, Every 8 hours PRN, nausea, vomiting, Nausea/Vomiting, Starting on Mon 5/20/24 at 1304, Recovery (CV) and FloorIf unable to take orally. Ricky Conway PA C Inpatient    ondansetron ODT (ZOFRAN-ODT) disintegrating tablet 4 mg 4 mg, oral, Every 8 hours PRN, nausea, vomiting, Nausea/Vomiting, Starting on Mon 5/20/24 at 1304, Recovery (CV) and Floor Ricky Conway PA C Inpatient    polyethylene glycol (MIRALAX) 17 gram packet 17 g 17 g, oral, Daily, First dose on Mon 5/20/24 at 1400, Recovery (CV) and FloorHold for loose stools or diarrhea. Ricky Conway PA C Inpatient    potassium chloride (KLOR-CON M) ER tablet (particles/crystals) 20 mEq 20 mEq, oral, As needed, for K 3.5 - 3.9 mEq/L, Starting on Mon 5/20/24 at 1959** This oral dosage form should NOT be crushed or pierced **  For patients unable to swallow whole,  may split tablet or dissolve.   To dissolve: Place tablet in 4 oz of water and allow 2 minutes to dissolve.  Stir for 30 seconds and administer immediately.  Rinse cup with 1 oz of water and administer immediately. Florina Guadarrama PA C Inpatient    potassium chloride (KLOR-CON M) ER tablet (particles/crystals) 40 mEq 40 mEq, oral, As needed, for K less than 3.5 mEq/L, Starting on Mon 5/20/24 at 1959** This oral dosage form should NOT be crushed or pierced **  For patients unable to swallow whole, may split tablet or dissolve.   To dissolve: Place tablet in 4 oz of water and allow 2 minutes to dissolve.  Stir for 30 seconds and administer immediately.  Rinse cup with 1 oz of water and administer immediately. Florina Guadarrama PA C Inpatient    potassium chloride 20 mEq in 100 mL IVPB  (premix) 20 mEq, intravenous, at 50 mL/hr, Administer over 120 Minutes, Every 8 hours PRN, < 4.5 mEq/l target 4.5-5, Starting on Mon 5/20/24 at 1036, For 90 days, Recovery (CV) and FloorICU Only.  Central Line Only.  Target 4.5-5 mEq/L. Draw serum K one hour before.  Repeat up to 3 times, and notify physician if K continues < 4.5 mEq/L. ** CENTRAL LINE PREFERRED ** Ricky Conway PA C Inpatient    rosuvastatin (CRESTOR) tablet 40 mg 40 mg, oral, Daily, First dose on Mon 5/20/24 at 1400, Recovery (CV) and Floor Ricky Conway PA C Inpatient    senna (SENOKOT) tablet 1 tablet 1 tablet, oral, 2 times daily, First dose on Mon 5/20/24 at 2000, Recovery (CV) and FloorHold for loose stools or diarrhea. Ricky Conway PA C Inpatient    tamsulosin (FLOMAX) 24 hr ER capsule 0.4 mg 0.4 mg, oral, Nightly, First dose on Mon 5/20/24 at 2200, Recovery (CV) and Floor** Do not open, crush, chew, or zuleta ** Ricky Conway PA C Inpatient            Outpatient Follow-Ups            In 1 month [facility]    In 2 months [facility]othor[facility] Surgery at [facility]            Discharge Disposition  Home   Code Status at Discharge: Full Code  Physician Order for Life-Sustaining Treatment Document Status        No documents found

## 2024-05-21 NOTE — NURSING NOTE
Pt adequate for discharge. Tele and IVs d/c. Reviewed discharge paperwork with pt who verbalized understanding. All belongings returned to pt. Pt waiting for ride home.

## 2024-05-21 NOTE — PLAN OF CARE
Problem: Adult Inpatient Plan of Care  Goal: Plan of Care Review  Outcome: Met  Flowsheets (Taken 5/21/2024 0037)  Progress: improving  Outcome Evaluation: pt adequate for discharge  Plan of Care Reviewed With: patient  Goal: Patient-Specific Goal (Individualized)  Outcome: Met  Goal: Absence of Hospital-Acquired Illness or Injury  Outcome: Met  Goal: Optimal Comfort and Wellbeing  Outcome: Met  Goal: Readiness for Transition of Care  Outcome: Met     Problem: Skin Injury Risk Increased  Goal: Skin Health and Integrity  Outcome: Met     Problem: Fall Injury Risk  Goal: Absence of Fall and Fall-Related Injury  Outcome: Met   Plan of Care Review  Plan of Care Reviewed With: patient  Progress: improving  Outcome Evaluation: pt adequate for discharge

## 2024-05-21 NOTE — PLAN OF CARE
Care Coordination Discharge Plan Summary    Admission Assessment Summary    General Information  Readmission Within the last 30 days: no previous admission in last 30 days  Does patient have a : No  Patient-Specific Goals (include timeframe):      Living Arrangements  Arrived From: home  Current Living Arrangements: independent living facility  People in Home: spouse  Home Accessibility:    Living Arrangement Comments:      Social Determinants of Health - Screenings  Housing Stability  In the last 12 months, was there a time when you were not able to pay the mortgage or rent on time?: No  In the last 12 months, how many places have you lived?: 1  In the last 12 months, was there a time when you did not have a steady place to sleep or slept in a shelter (including now)?: No  Utility Access  In the past 12 months has the electric, gas, oil, or water company threatened to shut off services in your home?: No  Transportation Needs  In the past 12 months, has lack of transportation kept you from medical appointments or from getting medications?: No  In the past 12 months, has lack of transportation kept you from meetings, work, or from getting things needed for daily living?: No    Functional Status Prior to Admission  Assistive Device/Animal Currently Used at Home: none  Functional Status Comments: Independent with ADL's  IADL Comments: Ambulates without AD    Discharge Needs Assessment    Concerns to be Addressed: no discharge needs identified, adjustment to diagnosis/illness, care coordination/care conferences, discharge planning  Current Discharge Risk:    Anticipated Changes Related to Illness: none    Discharge Plan Summary    Patient Choice  Offered/Gave Vendor List: no       Concerns / Comments: Per info received in surgical rounds today, pt is POD #1 s/p TAVR. Anticipating d/c home today. Dispo home without skill needs and services. LUIS A assessment completed at bedside with pt. Pt aware and in  agreement with current DCP and confirmed geronimo/Cr will provide ride at d/c. Will continue to follow until d/c completed    Discharge Plan:  Disposition/Destination: Home  / Home      Discharge Transportation:  Is Out of Hospital DNR needed at Discharge:    Does patient need discharge transport? No

## 2024-05-21 NOTE — PROGRESS NOTES
Cardiothoracic Intensivist Progress Note       CARDIOTHORACIC     IV dye allergy: no    Post-Operative Day # 1     Subjective     History of Present Illness: Daljit Girard Jr. is a 84 y.o. cisgender male with history of known non-rheumatic aortic valve stenosis prior to OR s/p transfemoral TAVR, chronic DM, and hypothyroidism that are being treated.                              Review of Systems:                                     Constitutional: no acute distress                                                  Eyes: denies difficulty with vision  Ears, nose, mouth, throat, and face: denies oral discomfort                                        Respiratory: denies shortness of breath                                  Cardiovascular: denies chest discomfort                                  Gastrointestinal: denies abdominal pain                                    Genitourinary: denies difficulty voiding                                      Hematologic: denies bleeding issues                                Musculoskeletal: denies muscle pain                                      Neurological: denies weakness                                   Integumentary: denies rash    Medical History:   Past Medical History:   Diagnosis Date    Coronary artery disease     Hypertension     Lipid disorder     Sleep apnea     cpap    Type 2 diabetes mellitus (CMS/HCC)        Surgical History:   Past Surgical History:   Procedure Laterality Date    COLONOSCOPY      CORONARY ARTERY BYPASS GRAFT      x 5 stents    ROTATOR CUFF REPAIR Bilateral        Prior to Admission medications    Medication Sig Start Date End Date Taking? Authorizing Provider   amoxicillin (AMOXIL) 500 mg capsule Take 500 mg by mouth 4 (four) times a day.   Yes Provider, Carmen Ornelas MD   aspirin 81 mg enteric coated tablet Take 81 mg by mouth nightly.   Yes ProviderCarmen MD   clopidogreL (PLAVIX) 75 mg tablet Take 75 mg by mouth daily.   Yes  Provider, Carmen Ornelas MD   DULoxetine (CYMBALTA) 60 mg capsule Take 60 mg by mouth nightly.   Yes Carmen Lugo MD   ezetimibe (ZETIA) 10 mg tablet Take 10 mg by mouth nightly.   Yes Carmen Lugo MD   levothyroxine (SYNTHROID) 150 mcg tablet Take 150 mcg by mouth daily.   Yes Carmen Lugo MD   LORazepam (ATIVAN) 1 mg tablet Take 1 mg by mouth 2 (two) times a day.   Yes Carmen Lugo MD   metFORMIN (GLUCOPHAGE) 500 mg tablet Take 500 mg by mouth 2 (two) times a day with meals.   Yes Carmen Lugo MD   pantoprazole (PROTONIX) 40 mg EC tablet Take 40 mg by mouth nightly.   Yes Carmen Lugo MD   rosuvastatin (CRESTOR) 40 mg tablet Take 40 mg by mouth daily.   Yes Carmen Lugo MD   tamsulosin (FLOMAX) 0.4 mg capsule Take 0.4 mg by mouth nightly.   Yes Carmen Lugo MD   temazepam (RESTORIL) 15 mg capsule Take 15 mg by mouth nightly as needed for sleep.   Yes Carmen Lugo MD   colchicine (COLCRYS) 0.6 mg tablet 0.6 mg as needed.    Provider, Carmne Ornelas MD       Allergies: Gluten    Social History:   Social History     Socioeconomic History    Marital status:      Spouse name: None    Number of children: None    Years of education: None    Highest education level: None   Tobacco Use    Smoking status: Never    Smokeless tobacco: Never   Substance and Sexual Activity    Alcohol use: Yes     Comment: socially     Social Determinants of Health     Food Insecurity: No Food Insecurity (5/21/2024)    Hunger Vital Sign     Worried About Running Out of Food in the Last Year: Never true     Ran Out of Food in the Last Year: Never true   Transportation Needs: No Transportation Needs (5/21/2024)    PRAPARE - Transportation     Lack of Transportation (Medical): No     Lack of Transportation (Non-Medical): No   Housing Stability: Low Risk  (5/21/2024)    Housing Stability Vital Sign     Unable to Pay for  Housing in the Last Year: No     Number of Places Lived in the Last Year: 1     Unstable Housing in the Last Year: No       Family History: History reviewed. No pertinent family history.    Objective     Vital signs in last 24 hours:  Temp:  [36.5 °C (97.7 °F)-37.1 °C (98.7 °F)] 36.5 °C (97.7 °F)  Heart Rate:  [50-72] 71  Resp:  [8-21] 16  BP: (120-180)/() 142/69      Intake/Output Summary (Last 24 hours) at 5/21/2024 1016  Last data filed at 5/21/2024 0845  Gross per 24 hour   Intake 2390 ml   Output 4205 ml   Net -1815 ml     Intake/Output this shift:  I/O this shift:  In: 10 [I.V.:10]  Out: 1185 [Urine:1185]    Labs  Lab Results   Component Value Date    WBC 8.61 05/21/2024    HGB 13.4 (L) 05/21/2024    HCT 40.3 05/21/2024     05/21/2024    CHOL 151 04/24/2015    TRIG 113 04/24/2015    HDL 46 04/24/2015    ALT 19 05/13/2024    AST 23 05/13/2024     05/21/2024    K 4.2 05/21/2024     05/21/2024    CREATININE 0.9 05/21/2024    BUN 21 05/21/2024    CO2 24 05/21/2024    MG 2.2 05/21/2024    PHOS 4.3 05/21/2024    TSH 15.07 (H) 05/17/2024    INR 1.2 05/20/2024    HGBA1C 6.2 (H) 05/13/2024    PT 15.4 (H) 05/20/2024    PTT 31 05/20/2024         Cardiac Imaging    TRANSTHORACIC ECHO (TTE) COMPLETE 05/13/2024    Interpretation Summary  The left ventricular cavity size is normal with mildly increased wall thickness and preserved systolic function. Estimated EF 60-65%. No regional wall motion abnormalities. Septal motion consistent with a postoperative state. Indeterminate diastolic function.    The aortic valve is tricuspid. Severe aortic stenosis with a peak velocity of 4.3 m/s and a mean gradient of 41 mmHg measured from the apex. Calculated aortic valve area of 0.9 cm2. Trace regurgitation.    The visualized portions of the aortic root and ascending aorta are normal in size.    The mitral valve is mildly thickened. Mild mitral annular calcification. No stenosis. Mild regurgitation.    The left  atrium is normal in size.    The right ventricular cavity is normal with preserved systolic function.    The pulmonic valve is not well seen. There is trace pulmonic regurgitation.    The tricuspid valve is normal in appearance. Trace regurgitation with insufficient regurgitant jet to estimate RVSP.    The right atrium is normal in size.    The IVC is normal in size and collapses > 50% with inspiration consistent with normal right atrial pressures.    Trace pericardial effusion.    No prior study for comparison.        Full Code    Head/Ear/Nose/Throat:     Normo-cephalic. Bruising on chin from dental extraction.                               Eyes:     EOMI and PERRL.                     Respiratory:     diminished at the bases b/l.               Cardiovascular:     SB.              Gastrointestinal:     + Bowel sounds and non-tender.                 Genitourinary:     no-deformities.                    Extremities:     trace edema b/l lower extremities.            Musculoskeletal:      No injury or deformity.                   Neurological:     follows commands.       Behavior/Emotional:     appropriate and cooperative.                           Lymph:      No adenopathy noted.                               Skin:      Clean, dry and intact.     Examination of the patient performed at the bedside. Rounded with ICU team and discussed management/plan with surgical staff. Medications, radiological studies and labs reviewed and discussed with attending of record, Dr. Joe Campos.    Cardiothoracic Assessment and Plan:    Neurologic: A&Ox3 and pain controlled.     Cardiovascular: Severe AS prior to OR s/p transfemoral TAVR, also with CAD and essential hypertension. Off vasoactive medications and meeting BP goals. Will continue to optimize cardiac medications. Continue ASA.      Respiratory: I personally reviewed the most recent CXR which portrayed mild pulmonary congestion with interval clearing compared to  previous exam. Will encourage IS, mobilization and wean O2 PRN.      Genitourinary: No santos. Making adequate urine output. Will avoid nephrotoxic medications. Continue BPH therapy.     Endocrine: Follow [facility]. Hypothyroidism and DM management. Appreciate Endocrinology input.     Hematologic: No evidence active bleeding. DVT ppx per surgeon.     Infectious Disease: No indication for antibiotics at this time.     Fluids, Electrolytes: Electrolytes replaced per protocol. Goal for net negative fluid balance.     Gastrointestinal: PO as tolerated. GI ppx.     Skin: OOB, PT when able.      Tubes, lines and drains: Will remove superfluous invasive monitors PRN.    Disposition: Plan for discharge home today.           Jed Delcid, DO  5/21/2024

## 2024-05-23 ENCOUNTER — TELEPHONE (OUTPATIENT)
Dept: SCHEDULING | Facility: CLINIC | Age: 84
End: 2024-05-23
Payer: MEDICARE

## 2024-05-23 ENCOUNTER — DOCUMENTATION (OUTPATIENT)
Dept: CARDIOTHORACIC SURGERY | Facility: CLINIC | Age: 84
End: 2024-05-23
Payer: MEDICARE

## 2024-05-23 ENCOUNTER — TELEPHONE (OUTPATIENT)
Dept: CARDIOTHORACIC SURGERY | Facility: CLINIC | Age: 84
End: 2024-05-23
Payer: MEDICARE

## 2024-05-23 NOTE — PROGRESS NOTES
POST  D/C Questionnaire    Daljit Girard Jr.  1940  [id number]      PT QUESTIONNAIRE:     Do you have your D/C instructions from the hospital? Yes                         ~Clarification needed?       No                         Did you get your new prescriptions yet? Yes      Do you have any questions about your medication regimen? Yes, need clarification on when to stop amoxicillin. Unable to see a stop date lsited, will need to clarify with CT team and return call.      Do you feel any palpitations or dizziness? no      How is your mobility? no      How is your appetite? good      How are you sleeping?  fine      Are you experiencing any shortness of breath at rest or GHOTRA? No      Are you clear on your activity and/or diet restrictions? Yes      Are you experiencing any groin site swelling, redness, drainage, increase in pain, warmth? Some bruising      Is your incision site/access site care clear to you? Yes      Do you have your follow up appts scheduled? CT 7/10/24  PCP appt date: 7/23/24  Cardiology appt date: calling to schedule    Encouraged pt to call [phone] with any questions or concerns.

## 2024-05-23 NOTE — TELEPHONE ENCOUNTER
On post-op phone call patient requested clarification of end date for amoxicillin that was on discharge instructions. Original amoxicillin prescription was ordered by patient's dentist Dr. Wells so he was directed to clarify with her office.

## 2024-06-04 ENCOUNTER — TELEPHONE (OUTPATIENT)
Dept: SCHEDULING | Facility: CLINIC | Age: 84
End: 2024-06-04

## 2024-06-04 NOTE — TELEPHONE ENCOUNTER
Call placed to pt. Informed him per MAGGIE Stokes that he may drive as long as he is not experiencing any dizziness, palpitations, or weakness and is not currently on an MCOT monitor for arrythmia. Pt denies all of the previous symptoms or issues. Pt verbalized understanding and was appreciative of call.

## 2024-06-04 NOTE — TELEPHONE ENCOUNTER
I will discuss the result in upcoming appointment.     Davina Grimes MD   Pt calling wanting to know if there is any restrictions regarding driving , please advise        P:[phone]

## 2024-06-13 NOTE — TELEPHONE ENCOUNTER
Pt calling to see if he can start to push his wife in a wheel chair and if not yet, a time frame for when he can    Pt can be reached at [phone]

## 2024-07-10 ENCOUNTER — HOSPITAL ENCOUNTER (OUTPATIENT)
Dept: CARDIOLOGY | Facility: HOSPITAL | Age: 84
Discharge: HOME | End: 2024-07-10
Attending: NURSE PRACTITIONER
Payer: MEDICARE

## 2024-07-10 VITALS
HEIGHT: 69 IN | WEIGHT: 220 LBS | SYSTOLIC BLOOD PRESSURE: 135 MMHG | BODY MASS INDEX: 32.58 KG/M2 | HEART RATE: 61 BPM | DIASTOLIC BLOOD PRESSURE: 65 MMHG

## 2024-07-10 DIAGNOSIS — Z95.2 S/P TAVR (TRANSCATHETER AORTIC VALVE REPLACEMENT): ICD-10-CM

## 2024-07-10 LAB
AORTIC ROOT ANNULUS - M-MODE: 3.1 CM
AORTIC VALVE MEAN VELOCITY: 1.64 M/S
AORTIC VALVE VELOCITY TIME INTEGRAL: 55.3 CM
ASCENDING AORTA: 4.1 CM
AV MEAN GRADIENT: 13 MMHG
AV PEAK GRADIENT: 24 MMHG
AV PEAK VELOCITY-S: 2.45 M/S
AV VALVE AREA INDEX: 0.84
AV VALVE AREA: 1.79 CM2
AV VELOCITY RATIO: 0.38
AVA (VTI): 1.85 CM2
BSA FOR ECHO PROCEDURE: 2.2 M2
CUSP SEPARATION: 1.3 CM
DOP CALC LVOT STROKE VOLUME: 102.05 ML
E WAVE DECELERATION TIME: 209 MS
E/A RATIO: 0.7
E/E' RATIO: 12.3
E/LAT E' RATIO: 11.9
EDV (BP): 150 CM3
EF (A4C): 54.8 %
EF A2C: 50.3 %
EJECTION FRACTION: 50.9 %
ESV (BP): 73.6 CM3
FRACTIONAL SHORTENING: 30.48 %
HEART RATE: 61 BPM
INTERVENTRICULAR SEPTUM: 1.16 CM
IVC-EXP: 1.22 CM
IVC-INS: 0.8 CM
LA ESV (BP): 54.1 CM3
LA ESV INDEX (A2C): 23 CM3/M2
LA ESV INDEX (BP): 24.59 CM3/M2
LAAS-AP2: 19.9 CM2
LAAS-AP4: 18.7 CM2
LAD 2D: 5.4 CM
LALD A4C: 5.67 CM
LALD A4C: 6.42 CM
LAV-S: 50.6 CM3
LEFT ATRIUM VOLUME INDEX: 23.32 CM3/M2
LEFT ATRIUM VOLUME: 51.3 CM3
LEFT INTERNAL DIMENSION IN SYSTOLE: 3.33 CM (ref 3.46–5.23)
LEFT VENTRICLE DIASTOLIC VOLUME INDEX: 64.55 CM3/M2
LEFT VENTRICLE DIASTOLIC VOLUME: 142 CM3
LEFT VENTRICLE MASS INDEX: 94.09 G/M2
LEFT VENTRICLE SYSTOLIC VOLUME INDEX: 29.18 CM3/M2
LEFT VENTRICLE SYSTOLIC VOLUME: 64.2 CM3
LEFT VENTRICULAR INTERNAL DIMENSION IN DIASTOLE: 4.79 CM (ref 5.92–8.22)
LEFT VENTRICULAR MASS: 207 G
LEFT VENTRICULAR POSTERIOR WALL IN END DIASTOLE: 1.15 CM (ref 0.74–1.38)
LV DIASTOLIC VOLUME: 156 CM3
LV ESV (APICAL 2 CHAMBER): 77.6 CM3
LVAD-AP2: 41 CM2
LVAD-AP4: 39.3 CM2
LVAS-AP2: 27.2 CM2
LVAS-AP4: 23.1 CM2
LVEDVI(A2C): 70.91 CM3/M2
LVEDVI(BP): 68.18 CM3/M2
LVESVI(A2C): 35.27 CM3/M2
LVESVI(BP): 33.45 CM3/M2
LVLD-AP2: 8.88 CM
LVLD-AP4: 9.02 CM
LVLS-AP2: 8.22 CM
LVLS-AP4: 7.5 CM
LVOT 2D: 2.5 CM
LVOT A: 4.91 CM2
LVOT MG: 3 MMHG
LVOT MV: 0.72 M/S
LVOT PEAK VELOCITY: 1.14 M/S
LVOT PG: 5 MMHG
LVOT STROKE VOLUME INDEX: 46.39 ML/M2
LVOT VTI: 20.8 CM
MLH CV ECHO AVA INDEX VELOCITY RATIO: 0.8
MV E'TISSUE VEL-LAT: 0.07 M/S
MV E'TISSUE VEL-MED: 0.07 M/S
MV PEAK A VEL: 1.13 M/S
MV PEAK E VEL: 0.82 M/S
MV STENOSIS PRESSURE HALF TIME: 61 MS
MV VALVE AREA P 1/2 METHOD: 3.61 CM2
POSTERIOR WALL: 1.15 CM
PULM VEIN S/D RATIO: 1.5
PV PEAK D VEL: 0.26 M/S
PV PEAK S VEL: 0.38 M/S
RVOT VMAX: 0.89 M/S
RVOT VTI: 14.9 CM
SEPTAL TISSUE DOPPLER FREE WALL LATE DIA VELOCITY (APICAL 4 CHAMBER VIEW): 0.1 M/S
TAPSE: 2.19 CM
Z-SCORE OF LEFT VENTRICULAR DIMENSION IN END DIASTOLE: -3.76
Z-SCORE OF LEFT VENTRICULAR DIMENSION IN END SYSTOLE: -1.93
Z-SCORE OF LEFT VENTRICULAR POSTERIOR WALL IN END DIASTOLE: 0.68

## 2024-07-10 PROCEDURE — 93306 TTE W/DOPPLER COMPLETE: CPT | Mod: 26 | Performed by: INTERNAL MEDICINE

## 2024-07-10 PROCEDURE — 93306 TTE W/DOPPLER COMPLETE: CPT

## 2024-07-22 ENCOUNTER — TELEPHONE (OUTPATIENT)
Dept: CARDIOTHORACIC SURGERY | Facility: CLINIC | Age: 84
End: 2024-07-22
Payer: MEDICARE

## 2024-07-22 NOTE — TELEPHONE ENCOUNTER
I left a detailed message for patient.  Need to RS the TAVR 1-month follow up from Friday 7/26 to our CT surgery schedule on 7/25.  Appt time to stay the same 10:00 am.    Asked pt to call back to confirm.

## 2024-07-23 NOTE — TELEPHONE ENCOUNTER
I reached pt and confirmed his appt change.  Could you please send appt reminder to the email on file.

## 2024-07-25 ENCOUNTER — OFFICE VISIT (OUTPATIENT)
Dept: CARDIOTHORACIC SURGERY | Facility: CLINIC | Age: 84
End: 2024-07-25
Payer: MEDICARE

## 2024-07-25 VITALS
RESPIRATION RATE: 18 BRPM | SYSTOLIC BLOOD PRESSURE: 145 MMHG | BODY MASS INDEX: 33.92 KG/M2 | HEIGHT: 69 IN | DIASTOLIC BLOOD PRESSURE: 63 MMHG | HEART RATE: 63 BPM | TEMPERATURE: 97.3 F | WEIGHT: 229 LBS | OXYGEN SATURATION: 96 %

## 2024-07-25 DIAGNOSIS — Z95.2 S/P TAVR (TRANSCATHETER AORTIC VALVE REPLACEMENT): Primary | ICD-10-CM

## 2024-07-25 PROCEDURE — 99214 OFFICE O/P EST MOD 30 MIN: CPT | Performed by: STUDENT IN AN ORGANIZED HEALTH CARE EDUCATION/TRAINING PROGRAM

## 2024-07-25 PROCEDURE — 93000 ELECTROCARDIOGRAM COMPLETE: CPT | Performed by: INTERNAL MEDICINE

## 2024-07-25 RX ORDER — LISINOPRIL 20 MG/1
20 TABLET ORAL DAILY
COMMUNITY

## 2024-07-25 NOTE — PROGRESS NOTES
Daljit BRITTON Era Douglas ([id number])  1940 7/25/2024       [place] Cardiomyopathy Questionnaire (KCCQ-12)    The following questions refer to your heart failure and how it may affect your life. Please read and complete the following  questions. There are no right or wrong answers. Please mehdi the answer that best applies to you.      1. Heart failure affects different people in different ways. Some feel shortness of breath while others feel fatigue. Please  indicate how much you are limited by heart failure (shortness of breath or fatigue) in your ability to do the following  activities over the past 2 weeks.    Activity     Extremely Limited    (1) Quite a bit Limited    (2) Moderately Limited    (3) Slightly Limited    (4) Not At All Limited    (5) Limited for other reasons or do not do the activity   (6) SCORE   A. Showering/bathing         Not At All Limited (5)   B. Walking 1 matilde on level ground       Not At All Limited (5)*     C. Hurrying or jogging  (as if to catch a bus)         Slightly Limited (4)         2. Over the past 2 weeks, how many times did you have swelling in your feet, ankles or legs when you woke up in the morning?      Every morning    (1) 3 or more times  per week but  not every day  (2) 1-2 times per week     (3) Less than once a week     (4) Never over the past 2 weeks     (5)   SCORE          Never over the past 2 weeks  (5)          3. Over the past 2 weeks, on average, how many times has fatigue limited your ability to do what you wanted?    All of  the time    (1) Several times  per day    (2) At least  once a day    (3) 3 or more times  per week but  not every day  (4) 1-2 times  per week    (5) Less than  once a week    (6) Never over the  past 2 weeks    (7)     SCORE          Less than once a week (6)         4. Over the past 2 weeks, on average, how many times has shortness of breath limited your ability to do what you wanted?    All of  the time    (1) Several  times  per day    (2) At least  once a day    (3) 3 or more times  per week but  not every day  (4) 1-2 times  per week    (5) Less than  once a week    (6) Never over the  past 2 weeks    (7)     SCORE            Less than once a week (6)         5. Over the past 2 weeks, on average, how many times have you been forced to sleep sitting up in a chair or with at  least 3 pillows to prop you up because of shortness of breath?      Every morning    (1) 3 or more times  per week but  not every day  (2) 1-2 times per week     (3) Less than once a week     (4) Never over the past 2 weeks     (5)     SCORE          Never over the past 2 weeks  (5)          6. Over the past 2 weeks, how much has your heart failure limited your enjoyment of life?    It has extremely  limited my enjoyment  of life    (1) It has limited my  enjoyment of life  quite a bit    (2) It has moderately  limited my enjoyment  of life    (3) It has slightly  limited my enjoyment  of life    (4) It has not limited  my enjoyment  of life at all    (5)     SCORE        It has not limited my enjoyment of life at all (5)            7. If you had to spend the rest of your life with your heart failure the way it is right now, how would you feel about this?    Not at all  satisfied    (1) Mostly  dissatisfied    (2) Somewhat  satisfied    (3) Mostly  satisfied    (4) Completely  satisfied    (5)   SCORE          Somewhat satisfied (3)         8. How much does your heart failure affect your lifestyle? Please indicate how your heart failure may have limited your  participation in the following activities over the past 2 weeks.        Activity     Severely Limited    (1) Limited  Quite a bit   (2) Moderately Limited    (3) Slightly Limited    (4) Did not limit at all    (5) Does not apply or did not do for other reasons  (6)     SCORE   A.  Hobbies, recreational  activities         Did not limit at all (5)   B. Working or doing  household chores         Did not  limit at all (5)   C. Visiting family or  friends out of your  home           Did not limit at all (5)

## 2024-07-25 NOTE — LETTER
July 25, 2024     Jay Ordoñez MD  [address]  [address]  [facility] [address]    Patient: Daljit Girard Jr.  YOB: 1940  Date of Visit: 7/25/2024      Dear Dr. Ordoñez:    Thank you for referring Daljit Girard Jr. to me for evaluation. Below are my notes for this consultation.    If you have questions, please do not hesitate to call me. I look forward to following your patient along with you.         Sincerely,        MAGGIE Zee        CC: MD Divya Crowe, MAGGIE Kim  7/25/2024 11:02 AM  Sign when Signing Visit  [facility]          Dr. Andres Stokes PA-C   [facility]                                          [phone]    CARDIOVASCULAR SURGERY FOLLOW-UP PROGRESS NOTE    Subjective  Subjective  Mr. Daljit Girard Jr. is a 84 y.o. male with a PMH of CAD s/p robotic CABG (2014) and PCI (2016), carotid stenosis, HLD, HTN, and aortic stenosis who presents today for his 1 month follow-up s/p transfemoral #29mm Crespo Rebecca S3 transcatheter aortic valve replacement by Dr. Joe Campos on 5/20/24.       Patient received no blood products and did not require a TVP placement post operatively.  He recovered in PACU and was transitioned to the CT step down unit for monitoring overnight.  Patient had an uneventful night.  On POD 1 patient completed all postoperative imaging.  CXR was without pneumo, and TTE revealed no valvular regurgitation and trace paravalvular regurgitation.  Postoperatively the patient was hypertensive SBP 170s-180s, he was given IVP hydralazine x2 and was started on norvasc 5mg PO daily.  He responded well to medication with discharge SBP 130s-140s. He was ambulating without supplemental oxygen, hemodynamically stable, and in sinus rhythm.  It was at this time patient was determined to stable for discharge to home.    He cannot tell if he has noticed a significant change in his  "baseline cardiac symptoms. He moved to [facility] after his wife had multiple strokes. He states he has not been as active since their move. He has noticed that after walking he no longer is \"huffing and puffing\". His amlodipine was recently switched to lisinopril by his PCP. He does not routinely monitor his blood pressures as the cuff he bought was not accurate. There is nursing staff at his residence that he can have monitor his blood pressure. He denies any dizziness, syncope, near syncope, CP, palpitations, orthopnea, PND, or LE edema. He was able to walk up the flight of stairs coming into this appointment today without having to stop.     Primary Cardiologist: Dr. Gillespie  PCP: Dr. Ordoñez    Medications  Current Outpatient Medications   Medication Sig Dispense Refill   • amLODIPine (NORVASC) 5 mg tablet Take 1 tablet (5 mg total) by mouth daily. 30 tablet 1   • amoxicillin (AMOXIL) 500 mg capsule Take 500 mg by mouth 4 (four) times a day.     • aspirin 81 mg enteric coated tablet Take 81 mg by mouth nightly.     • clopidogreL (PLAVIX) 75 mg tablet Take 75 mg by mouth daily.     • colchicine (COLCRYS) 0.6 mg tablet 0.6 mg as needed.     • DULoxetine (CYMBALTA) 60 mg capsule Take 60 mg by mouth nightly.     • ezetimibe (ZETIA) 10 mg tablet Take 10 mg by mouth nightly.     • levothyroxine (SYNTHROID) 150 mcg tablet Take 150 mcg by mouth daily.     • LORazepam (ATIVAN) 1 mg tablet Take 1 mg by mouth 2 (two) times a day.     • metFORMIN (GLUCOPHAGE) 500 mg tablet Take 1 tablet (500 mg total) by mouth 2 (two) times a day with meals. 60 tablet 0   • pantoprazole (PROTONIX) 40 mg EC tablet Take 40 mg by mouth nightly.     • rosuvastatin (CRESTOR) 40 mg tablet Take 40 mg by mouth daily.     • tamsulosin (FLOMAX) 0.4 mg capsule Take 0.4 mg by mouth nightly.     • temazepam (RESTORIL) 15 mg capsule Take 15 mg by mouth nightly as needed for sleep.       No current facility-administered medications for this visit. "       Review of Systems  Negative 10 point ROS besides what is noted in HPI.    Objective  Vitals:    24 0951   BP: (!) 145/63   Pulse: 63   Resp: 18   Temp: 36.3 °C (97.3 °F)   SpO2: 96%     Repeat 134/70    Physical Exam  Heart:  regular rate and rhythm, S1, S2 normal, no murmur, click, rub or gallop  Lungs:  clear to auscultation bilaterally  Extremities:  no edema, peripheral pulses 2+ and symmetric    Imaging:  [id number]      TTE: 7/10/24  Interpretation Summary    Technically difficult study.  The left ventricular cavity size is normal with mild left ventricular hypertrophy and preserved systolic function. Estimated EF 60% by visual estimate. Abnormal septal motion. Normal diastolic function.  There is a well-seated well-seated 29mm Rebecca 3 transcatheter aortic valve replacement bioprosthetic. The transvalvular velocity is 2.1 m/s and the mean gradient is 9 mmHg measured from the right sternal border. Calculated valve area measures 1.9 cm2. There is no trans or paravalvular regurgitation.  The visualized portion of the ascending aorta measures 4.1 cm in diameter.  The mitral valve is thickened. Mild mitral annular calcification. Trace regurgitation.   The left atrium is normal in size.  The right ventricular cavity is normal with preserved systolic function.   The pulmonic valve is not well seen. There is trace pulmonic regurgitation.  The tricuspid valve is normal.  Trace tricuspid regurgitation with insufficient jet to estimate RVSP.  The right atrium is normal in size  The IVC is  normal in size and collapses greater than 50% with inspiration consistent with normal right atrial pressures.   No pericardial effusion.   Compared to previous echocardiogram from 2024, there is no significant change.     TTE: 24  Interpretation Summary    Technically difficult study. Limited study for post-TAVR evaluation. ECG tracing demonstrates sinus rhythm at 60 bpm.   The left ventricular cavity size is  normal with moderate concentric left ventricular hypertrophy and preserved systolic function. Estimated EF 60% by biplane method of discs. Abnormal septal motion. Diastolic function not assessed.  There is a well-seated 29mm Rebecca 3 transcatheter aortic valve replacement bioprosthetic aortic valve (5/20/2024). The transvalvular velocity is 2.58 m/s and the mean gradient is 16 mmHg measured from the apex. Calculated valve area measures 1.94 cm2. There is no valvular regurgitation. There is trace paravalvular regurgitation.    The aortic root is normal in size.  The mitral valve is mildly thickened. Mild mitral annular calcification. Trace regurgitation.   The left atrium is normal in size.   The right ventricular cavity is normal with preserved systolic function.   The pulmonic valve is not well seen.  The tricuspid valve is not well assessed.   The right atrium is normal in size.  The IVC is not well seen.   Trace pericardial effusion.   Compared to previous echocardiogram from 5/20/2024, gradients across the prosthetic aortic valve are slightly higher.     Assessment  S/P transfemoral #29mm Crespo Rebecca S3 transcatheter aortic valve replacement by Dr. Joe Campos on 5/20/24.      Postop hypertension, received IV hydralazine x 2 and discharged home on amlodipine 5mg daily    Echo reviewed personally. The most recent echocardiogram shows normal function of the prosthetic valve.  PV 2.1 m/s, Mean gradient 9mmHg, EF 60%, AVVA 1.9cm2,  no paravalvular AI, trace TR.   [facility] II.  Patient remains on daily diuretics.  There have been no signs or symptoms of congestive heart failure.  No chest discomfort.  No syncopal or presyncopal events.  The patient is tolerating medical therapy.  The patient was instructed regarding appropriate antibiotic prophylaxis.  We recommend continuing to monitor the valve by serial echocardiograms.    Plan  Ongoing medical management with cardiology.  No physical limitations or  restrictions at this time.  Continue ASA 81mg + Plavix 75mg for 6 months for TAVR purposes, then transition to ASA 81mg indefinitely. Continue to monitor blood pressure regularly. Contact your PCP or cardiologist should blood pressure remain above 130/80 consistently. [facility] in 1 year with an echo. Cardiac rehab referral sent.       Dental prophylaxis reiterated. Please wait 6 months after your procedure before scheduling any routine dental work.   Dental Prophylaxis for valve repair/replacement surgery:  You will need dental prophylaxis prior to any cleaning or procedures.  Recommendations for 2 grams of amoxicillin or 500mg Azithromycin (if you have an allergy to penicillin) 1 hours prior to appointment.        MAGGIE Zee C

## 2024-07-25 NOTE — PATIENT INSTRUCTIONS
Return to clinic in 1 year with repeat echocardiogram prior.  Continue lisinopril 20mg daily, monitor blood pressure regularly. If blood pressure remains above 130/80, please reach out to Dr. Ordoñez or your cardiologist.  Can stop amlodipine 5mg.   OK to begin cardiac rehab.  No restrictions at this time.   Continue ASA 81mg + Plavix 75mg for 6 months for TAVR purposes, then transition to ASA 81mg indefintely.  Dental prophylaxis reiterated. Please wait 6 months after your procedure before scheduling any routine dental work.     Dental Prophylaxis for valve repair/replacement surgery:  You will need dental prophylaxis prior to any cleaning or procedures.  Recommendations for 2 grams of amoxicillin or 500mg Azithromycin (if you have an allergy to penicillin) 1 hours prior to appointment.    Call with any questions [phone]

## 2024-07-25 NOTE — PROGRESS NOTES
"[facility]          Dr. Andres Stokes PA-C   [facility]                                          [phone]    CARDIOVASCULAR SURGERY FOLLOW-UP PROGRESS NOTE    Subjective  Subjective   Mr. Daljit Girard Jr. is a 84 y.o. male with a PMH of CAD s/p robotic CABG (2014) and PCI (2016), carotid stenosis, HLD, HTN, and aortic stenosis who presents today for his 1 month follow-up s/p transfemoral #29mm Crespo Rebecca S3 transcatheter aortic valve replacement by Dr. Joe Campos on 5/20/24.       Patient received no blood products and did not require a TVP placement post operatively.  He recovered in PACU and was transitioned to the CT step down unit for monitoring overnight.  Patient had an uneventful night.  On POD 1 patient completed all postoperative imaging.  CXR was without pneumo, and TTE revealed no valvular regurgitation and trace paravalvular regurgitation.  Postoperatively the patient was hypertensive SBP 170s-180s, he was given IVP hydralazine x2 and was started on norvasc 5mg PO daily.  He responded well to medication with discharge SBP 130s-140s. He was ambulating without supplemental oxygen, hemodynamically stable, and in sinus rhythm.  It was at this time patient was determined to stable for discharge to home.    He cannot tell if he has noticed a significant change in his baseline cardiac symptoms. He moved to [facility] after his wife had multiple strokes. He states he has not been as active since their move. He has noticed that after walking he no longer is \"huffing and puffing\". His amlodipine was recently switched to lisinopril by his PCP. He does not routinely monitor his blood pressures as the cuff he bought was not accurate. There is nursing staff at his residence that he can have monitor his blood pressure. He denies any dizziness, syncope, near syncope, CP, palpitations, orthopnea, PND, or LE edema. He was able to walk up " the flight of stairs coming into this appointment today without having to stop.     Primary Cardiologist: Dr. Gillespie  PCP: Dr. Ordoñez    Medications  Current Outpatient Medications   Medication Sig Dispense Refill    amLODIPine (NORVASC) 5 mg tablet Take 1 tablet (5 mg total) by mouth daily. 30 tablet 1    amoxicillin (AMOXIL) 500 mg capsule Take 500 mg by mouth 4 (four) times a day.      aspirin 81 mg enteric coated tablet Take 81 mg by mouth nightly.      clopidogreL (PLAVIX) 75 mg tablet Take 75 mg by mouth daily.      colchicine (COLCRYS) 0.6 mg tablet 0.6 mg as needed.      DULoxetine (CYMBALTA) 60 mg capsule Take 60 mg by mouth nightly.      ezetimibe (ZETIA) 10 mg tablet Take 10 mg by mouth nightly.      levothyroxine (SYNTHROID) 150 mcg tablet Take 150 mcg by mouth daily.      LORazepam (ATIVAN) 1 mg tablet Take 1 mg by mouth 2 (two) times a day.      metFORMIN (GLUCOPHAGE) 500 mg tablet Take 1 tablet (500 mg total) by mouth 2 (two) times a day with meals. 60 tablet 0    pantoprazole (PROTONIX) 40 mg EC tablet Take 40 mg by mouth nightly.      rosuvastatin (CRESTOR) 40 mg tablet Take 40 mg by mouth daily.      tamsulosin (FLOMAX) 0.4 mg capsule Take 0.4 mg by mouth nightly.      temazepam (RESTORIL) 15 mg capsule Take 15 mg by mouth nightly as needed for sleep.       No current facility-administered medications for this visit.       Review of Systems  Negative 10 point ROS besides what is noted in HPI.    Objective  Vitals:    24 0951   BP: (!) 145/63   Pulse: 63   Resp: 18   Temp: 36.3 °C (97.3 °F)   SpO2: 96%     Repeat 134/70    Physical Exam  Heart:  regular rate and rhythm, S1, S2 normal, no murmur, click, rub or gallop  Lungs:  clear to auscultation bilaterally  Extremities:  no edema, peripheral pulses 2+ and symmetric    Imaging:  [id number]      TTE: 7/10/24  Interpretation Summary    Technically difficult study.  The left ventricular cavity size is normal with mild left ventricular hypertrophy  and preserved systolic function. Estimated EF 60% by visual estimate. Abnormal septal motion. Normal diastolic function.  There is a well-seated well-seated 29mm Rebecca 3 transcatheter aortic valve replacement bioprosthetic. The transvalvular velocity is 2.1 m/s and the mean gradient is 9 mmHg measured from the right sternal border. Calculated valve area measures 1.9 cm2. There is no trans or paravalvular regurgitation.  The visualized portion of the ascending aorta measures 4.1 cm in diameter.  The mitral valve is thickened. Mild mitral annular calcification. Trace regurgitation.   The left atrium is normal in size.  The right ventricular cavity is normal with preserved systolic function.   The pulmonic valve is not well seen. There is trace pulmonic regurgitation.  The tricuspid valve is normal.  Trace tricuspid regurgitation with insufficient jet to estimate RVSP.  The right atrium is normal in size  The IVC is  normal in size and collapses greater than 50% with inspiration consistent with normal right atrial pressures.   No pericardial effusion.   Compared to previous echocardiogram from 5/21/2024, there is no significant change.     TTE: 5/21/24  Interpretation Summary    Technically difficult study. Limited study for post-TAVR evaluation. ECG tracing demonstrates sinus rhythm at 60 bpm.   The left ventricular cavity size is normal with moderate concentric left ventricular hypertrophy and preserved systolic function. Estimated EF 60% by biplane method of discs. Abnormal septal motion. Diastolic function not assessed.  There is a well-seated 29mm Rebecca 3 transcatheter aortic valve replacement bioprosthetic aortic valve (5/20/2024). The transvalvular velocity is 2.58 m/s and the mean gradient is 16 mmHg measured from the apex. Calculated valve area measures 1.94 cm2. There is no valvular regurgitation. There is trace paravalvular regurgitation.    The aortic root is normal in size.  The mitral valve is mildly  thickened. Mild mitral annular calcification. Trace regurgitation.   The left atrium is normal in size.   The right ventricular cavity is normal with preserved systolic function.   The pulmonic valve is not well seen.  The tricuspid valve is not well assessed.   The right atrium is normal in size.  The IVC is not well seen.   Trace pericardial effusion.   Compared to previous echocardiogram from 5/20/2024, gradients across the prosthetic aortic valve are slightly higher.     Assessment  S/P transfemoral #29mm Crespo Rebecca S3 transcatheter aortic valve replacement by Dr. Joe Campos on 5/20/24.      Postop hypertension, received IV hydralazine x 2 and discharged home on amlodipine 5mg daily    Echo reviewed personally. The most recent echocardiogram shows normal function of the prosthetic valve.  PV 2.1 m/s, Mean gradient 9mmHg, EF 60%, AVVA 1.9cm2,  no paravalvular AI, trace TR.   [facility] II.  Patient remains on daily diuretics.  There have been no signs or symptoms of congestive heart failure.  No chest discomfort.  No syncopal or presyncopal events.  The patient is tolerating medical therapy.  The patient was instructed regarding appropriate antibiotic prophylaxis.  We recommend continuing to monitor the valve by serial echocardiograms.    Plan  Ongoing medical management with cardiology.  No physical limitations or restrictions at this time.  Continue ASA 81mg + Plavix 75mg for 6 months for TAVR purposes, then transition to ASA 81mg indefinitely. Continue to monitor blood pressure regularly. Contact your PCP or cardiologist should blood pressure remain above 130/80 consistently. [facility] in 1 year with an echo. Cardiac rehab referral sent.       Dental prophylaxis reiterated. Please wait 6 months after your procedure before scheduling any routine dental work.   Dental Prophylaxis for valve repair/replacement surgery:  You will need dental prophylaxis prior to any cleaning or  procedures.  Recommendations for 2 grams of amoxicillin or 500mg Azithromycin (if you have an allergy to penicillin) 1 hours prior to appointment.        MAGGIE Zee

## 2024-07-26 LAB
ATRIAL RATE: 61
P AXIS: 31
PR INTERVAL: 168
QRS DURATION: 102
QT INTERVAL: 434
QTC CALCULATION(BAZETT): 436
R AXIS: 101
T WAVE AXIS: -77
VENTRICULAR RATE: 61

## 2024-10-22 ENCOUNTER — TELEPHONE (OUTPATIENT)
Dept: SCHEDULING | Facility: CLINIC | Age: 84
End: 2024-10-22
Payer: MEDICARE

## 2024-10-22 NOTE — TELEPHONE ENCOUNTER
[facility]  Fax #: [phone]    Can you please send over cardiac rehab script, hospital discharge summary, and most recent office note to fax # provided. Thanks!    And also to this fax #: [phone]

## 2024-10-22 NOTE — TELEPHONE ENCOUNTER
Pt called requests a call back from [place] regarding cardiac rehab    Pt can be reached at [phone]

## 2024-10-23 NOTE — TELEPHONE ENCOUNTER
Marilynn from [facility] stated Provider needs to sign the Cardiac Script. Please Advise     [facility]  Fax #: [phone] DK Surbamanian

## 2024-10-24 DIAGNOSIS — Z95.2 S/P TAVR (TRANSCATHETER AORTIC VALVE REPLACEMENT): Primary | ICD-10-CM

## 2024-10-24 NOTE — TELEPHONE ENCOUNTER
Nacho from [facility] calling in because he received the script for Cardiac Rehab.    Script has to be signed by the doctor ( or MD), it can not be signed by a PA.    Please has doctor sign order and fax to [phone] attdemond Griffith

## 2025-07-25 ENCOUNTER — HOSPITAL ENCOUNTER (OUTPATIENT)
Dept: CARDIOLOGY | Facility: HOSPITAL | Age: 85
Discharge: HOME | End: 2025-07-25
Attending: STUDENT IN AN ORGANIZED HEALTH CARE EDUCATION/TRAINING PROGRAM
Payer: MEDICARE

## 2025-07-25 VITALS
HEIGHT: 68 IN | DIASTOLIC BLOOD PRESSURE: 78 MMHG | BODY MASS INDEX: 33.34 KG/M2 | SYSTOLIC BLOOD PRESSURE: 171 MMHG | WEIGHT: 220 LBS | HEART RATE: 68 BPM

## 2025-07-25 DIAGNOSIS — Z95.2 S/P TAVR (TRANSCATHETER AORTIC VALVE REPLACEMENT): ICD-10-CM

## 2025-07-25 LAB
AORTIC ROOT ANNULUS - M-MODE: 3.1 CM
AORTIC ROOT ANNULUS - M-MODE: 3.1 CM
AORTIC VALVE MEAN VELOCITY: 1.61 M/S
AORTIC VALVE VELOCITY TIME INTEGRAL: 47.9 CM
ASCENDING AORTA: 3.7 CM
AV MEAN GRADIENT: 12 MMHG
AV PEAK GRADIENT: 22 MMHG
AV PEAK VELOCITY-S: 2.6 M/S
AV VALVE AREA INDEX: 0.94
AV VALVE AREA: 1.42 CM2
AV VELOCITY RATIO: 0.46
AVA (VTI): 2.07 CM2
BSA FOR ECHO PROCEDURE: 2.19 M2
DOP CALC LVOT STROKE VOLUME: 99.02 ML
E WAVE DECELERATION TIME: 359 MS
E/A RATIO: 0.6
E/E' RATIO: 9.7
E/LAT E' RATIO: 10.1
EDV (BP): 111 CM3
EF (A4C): 62.4 %
EF A2C: 56.2 %
EJECTION FRACTION: 58.6 %
ESV (BP): 46 CM3
LA ESV (BP): 54.8 CM3
LA ESV INDEX (A2C): 23.52 CM3/M2
LA ESV INDEX (BP): 25.02 CM3/M2
LAAS-AP2: 19 CM2
LAAS-AP4: 18.1 CM2
LAD 2D: 4.8 CM
LAL MED-LAT (A4C): 5.17 CM
LAV-S: 51.5 CM3
LEFT ATRIAL LENGTH SUPERIOR-INFERIOR (APICAL 2-CHAMBER VIEW): 5.79 CM
LEFT ATRIUM VOLUME INDEX: 24.11 CM3/M2
LEFT ATRIUM VOLUME: 52.8 CM3
LEFT VENTRICLE DIASTOLIC VOLUME INDEX: 57.99 CM3/M2
LEFT VENTRICLE DIASTOLIC VOLUME: 127 CM3
LEFT VENTRICLE SYSTOLIC VOLUME INDEX: 21.83 CM3/M2
LEFT VENTRICLE SYSTOLIC VOLUME: 47.8 CM3
LV DIASTOLIC VOLUME: 94.7 CM3
LV ESV (APICAL 2 CHAMBER): 41.5 CM3
LVAD-AP2: 30.3 CM2
LVAD-AP4: 35.7 CM2
LVAS-AP2: 18.5 CM2
LVAS-AP4: 20.2 CM2
LVCI: 2 L/MIN/M2
LVCO: 4.4 L/MIN
LVEDVI(A2C): 43.24 CM3/M2
LVEDVI(BP): 50.68 CM3/M2
LVESVI(A2C): 18.95 CM3/M2
LVESVI(BP): 21 CM3/M2
LVLD-AP2: 8.16 CM
LVLD-AP4: 8.42 CM
LVLS-AP2: 7.11 CM
LVLS-AP4: 7.62 CM
LVOT 2D: 2.4 CM
LVOT A: 4.52 CM2
LVOT MG: 2 MMHG
LVOT MV: 0.7 M/S
LVOT PEAK VELOCITY: 1.04 M/S
LVOT PG: 4 MMHG
LVOT STROKE VOLUME INDEX: 45.22 ML/M2
LVOT VTI: 21.9 CM
MLH CV ECHO AVA INDEX VELOCITY RATIO: 0.6
MV E'TISSUE VEL-LAT: 0.08 M/S
MV E'TISSUE VEL-MED: 0.08 M/S
MV PEAK A VEL: 1.36 M/S
MV PEAK E VEL: 0.77 M/S
MV STENOSIS PRESSURE HALF TIME: 105 MS
MV VALVE AREA P 1/2 METHOD: 2.1 CM2
PV PEAK GRADIENT: 8 MMHG
PV PV: 1.38 M/S
RIGHT VENTRICULAR LENGTH IN DIASTOLE (APICAL 4-CHAMBER VIEW): 7.73 CM
RV AP4 BASE: 3.79 CM
RV AP4 MID: 3.94 CM
RVOT VMAX: 0.99 M/S
RVOT VTI: 16.8 CM
SEPTAL TISSUE DOPPLER FREE WALL LATE DIA VELOCITY (APICAL 4 CHAMBER VIEW): 0.08 M/S
TAPSE: 1.88 CM

## 2025-07-25 PROCEDURE — 93306 TTE W/DOPPLER COMPLETE: CPT | Mod: 26 | Performed by: STUDENT IN AN ORGANIZED HEALTH CARE EDUCATION/TRAINING PROGRAM

## 2025-07-25 PROCEDURE — 93306 TTE W/DOPPLER COMPLETE: CPT

## (undated) DEVICE — SHEATH INTRODUCER PRELUDE IDEAL HYDROPHILIC SF 5F .021MM

## (undated) DEVICE — BLADE SCALPEL #11

## (undated) DEVICE — DRAPE IOBAN EXTRA LARGE

## (undated) DEVICE — COVER LIGHTHANDLE (STERILE SINGLE PA

## (undated) DEVICE — ADHESIVE SKIN DERMABOND ADVANCED 0.7ML

## (undated) DEVICE — DEVICE INFLATION 20/30 PRIORITY PACK W/COPILOT

## (undated) DEVICE — GLOVE SZ 8 MICRO PROTEXIS LATEX

## (undated) DEVICE — CATH 5FR 65CM LONG RIM

## (undated) DEVICE — GUIDEWIRE REGULAR STANDARD ANGLE TIP 035 DIAMETER 180 CM LON

## (undated) DEVICE — BLADE SCALPEL #15

## (undated) DEVICE — SHEATH INTRODUCER PRELUDE IDEAL HYDROPHILIC SF 7F .021MM

## (undated) DEVICE — DRAPE 3/4 REINFORCED 53X77 20/CS

## (undated) DEVICE — CATH D 6F FR4 100CM

## (undated) DEVICE — Device

## (undated) DEVICE — COVER MAYO STAND ST 30/CS

## (undated) DEVICE — SET MICROPUNCTURE INTRO

## (undated) DEVICE — KIT CATH LAB ANGIO

## (undated) DEVICE — CATH PIGTAIL 5FR

## (undated) DEVICE — GUIDEWIRE PLANTIUM PLUS ST 0.018 X 260CM

## (undated) DEVICE — PACK RFID MAJOR PROCEDURE PACK

## (undated) DEVICE — SHEATH 8 FR  WITH GUIDEWIRE

## (undated) DEVICE — GLOVE SZ 8.5 LINER PROTEXIS PI BL

## (undated) DEVICE — CATH RED RUBBER STER 18FR

## (undated) DEVICE — *T* GUIDEWIRE SAFARI EXTRA SMALL CURVE 0.889MM 275CM

## (undated) DEVICE — CATH BALLOON MUSTANG 8.0 X20 75CM

## (undated) DEVICE — PAD DEFIB BIPHASIC HANDS FREE

## (undated) DEVICE — APPLICATOR CHLORAPREP 26ML ORANGE TINT

## (undated) DEVICE — COVER CAMERA LIGHT HANDLE

## (undated) DEVICE — SHEET DRAPE 40X58 STERILE

## (undated) DEVICE — SUTURE BOOTS YELLOW STANDARD

## (undated) DEVICE — SHEATH INTRODUCER PRELUDE IDEAL HYDROPHILIC SF 6F .021MM

## (undated) DEVICE — TUBING SMOKE EVAC PENCIL COATED

## (undated) DEVICE — SUTURE SOFSILK 0 BLACK 1X30 V-20

## (undated) DEVICE — GUIDEWIRE DIAGNOSTIC 035-260 EXCHANGE

## (undated) DEVICE — ***USE 121412***PACK DEVICE IMPLANT TLH

## (undated) DEVICE — TRANSDUCER DISP 12IN CABLE